# Patient Record
Sex: FEMALE | Race: WHITE | NOT HISPANIC OR LATINO | Employment: OTHER | URBAN - METROPOLITAN AREA
[De-identification: names, ages, dates, MRNs, and addresses within clinical notes are randomized per-mention and may not be internally consistent; named-entity substitution may affect disease eponyms.]

---

## 2020-02-01 ENCOUNTER — HOSPITAL ENCOUNTER (EMERGENCY)
Facility: HOSPITAL | Age: 75
Discharge: HOME | End: 2020-02-01
Attending: EMERGENCY MEDICINE
Payer: MEDICARE

## 2020-02-01 ENCOUNTER — APPOINTMENT (EMERGENCY)
Dept: RADIOLOGY | Facility: HOSPITAL | Age: 75
End: 2020-02-01
Attending: EMERGENCY MEDICINE
Payer: MEDICARE

## 2020-02-01 VITALS
SYSTOLIC BLOOD PRESSURE: 129 MMHG | OXYGEN SATURATION: 94 % | BODY MASS INDEX: 25.76 KG/M2 | WEIGHT: 140 LBS | RESPIRATION RATE: 48 BRPM | HEIGHT: 62 IN | HEART RATE: 76 BPM | TEMPERATURE: 98.7 F | DIASTOLIC BLOOD PRESSURE: 78 MMHG

## 2020-02-01 DIAGNOSIS — R05.9 COUGH: Primary | ICD-10-CM

## 2020-02-01 DIAGNOSIS — J06.9 UPPER RESPIRATORY TRACT INFECTION, UNSPECIFIED TYPE: ICD-10-CM

## 2020-02-01 DIAGNOSIS — J40 BRONCHITIS: ICD-10-CM

## 2020-02-01 LAB
ALBUMIN SERPL-MCNC: 3.7 G/DL (ref 3.4–5)
ALP SERPL-CCNC: 88 IU/L (ref 35–126)
ALT SERPL-CCNC: 75 IU/L (ref 11–54)
ANION GAP SERPL CALC-SCNC: 10 MEQ/L (ref 3–15)
AST SERPL-CCNC: 52 IU/L (ref 15–41)
BASOPHILS # BLD: 0 K/UL (ref 0.01–0.1)
BASOPHILS NFR BLD: 0 %
BILIRUB SERPL-MCNC: 0.7 MG/DL (ref 0.3–1.2)
BILIRUB UR QL STRIP.AUTO: NEGATIVE MG/DL
BNP SERPL-MCNC: 32 PG/ML
BUN SERPL-MCNC: 15 MG/DL (ref 8–20)
CALCIUM SERPL-MCNC: 8.9 MG/DL (ref 8.9–10.3)
CHLORIDE SERPL-SCNC: 100 MEQ/L (ref 98–109)
CLARITY UR REFRACT.AUTO: CLEAR
CO2 SERPL-SCNC: 29 MEQ/L (ref 22–32)
COLOR UR AUTO: YELLOW
CREAT SERPL-MCNC: 0.7 MG/DL
DIFFERENTIAL METHOD BLD: ABNORMAL
EOSINOPHIL # BLD: 0 K/UL (ref 0.04–0.36)
EOSINOPHIL NFR BLD: 0 %
ERYTHROCYTE [DISTWIDTH] IN BLOOD BY AUTOMATED COUNT: 13.8 % (ref 11.7–14.4)
FLUAV RNA SPEC QL NAA+PROBE: NEGATIVE
FLUBV RNA SPEC QL NAA+PROBE: NEGATIVE
GFR SERPL CREATININE-BSD FRML MDRD: >60 ML/MIN/1.73M*2
GLUCOSE SERPL-MCNC: 185 MG/DL (ref 70–99)
GLUCOSE UR STRIP.AUTO-MCNC: NEGATIVE MG/DL
HCT VFR BLDCO AUTO: 37.9 %
HGB BLD-MCNC: 12.3 G/DL (ref 11.8–15.7)
HGB UR QL STRIP.AUTO: NEGATIVE
KETONES UR STRIP.AUTO-MCNC: NEGATIVE MG/DL
LEUKOCYTE ESTERASE UR QL STRIP.AUTO: NEGATIVE
LYMPHOCYTES # BLD: 0.38 K/UL (ref 1.2–3.5)
LYMPHOCYTES NFR BLD: 6 %
MCH RBC QN AUTO: 28.3 PG (ref 28–33.2)
MCHC RBC AUTO-ENTMCNC: 32.5 G/DL (ref 32.2–35.5)
MCV RBC AUTO: 87.3 FL (ref 83–98)
MONOCYTES # BLD: 0.26 K/UL (ref 0.28–0.8)
MONOCYTES NFR BLD: 4 %
NEUTS BAND # BLD: 0.06 K/UL (ref 0–0.53)
NEUTS BAND # BLD: 5.61 K/UL (ref 1.7–7)
NEUTS BAND NFR BLD: 1 %
NEUTS SEG NFR BLD: 88 %
NITRITE UR QL STRIP.AUTO: NEGATIVE
PDW BLD AUTO: 9.9 FL (ref 9.4–12.3)
PH UR STRIP.AUTO: 6 [PH]
PLAT MORPH BLD: NORMAL
PLATELET # BLD AUTO: 186 K/UL
PLATELET # BLD EST: ABNORMAL 10*3/UL
POTASSIUM SERPL-SCNC: 3 MEQ/L (ref 3.6–5.1)
PROT SERPL-MCNC: 6.9 G/DL (ref 6–8.2)
PROT UR QL STRIP.AUTO: NEGATIVE
RBC # BLD AUTO: 4.34 M/UL (ref 3.93–5.22)
RBC MORPH BLD: NORMAL
RSV RNA SPEC QL NAA+PROBE: NEGATIVE
SODIUM SERPL-SCNC: 139 MEQ/L (ref 136–144)
SP GR UR REFRACT.AUTO: 1.01
TROPONIN I SERPL-MCNC: <0.02 NG/ML
UROBILINOGEN UR STRIP-ACNC: 0.2 EU/DL
VARIANT LYMPHS NFR BLD: 1 %
WBC # BLD AUTO: 6.38 K/UL

## 2020-02-01 PROCEDURE — 71046 X-RAY EXAM CHEST 2 VIEWS: CPT

## 2020-02-01 PROCEDURE — 25000000 HC PHARMACY GENERAL: Performed by: EMERGENCY MEDICINE

## 2020-02-01 PROCEDURE — 87631 RESP VIRUS 3-5 TARGETS: CPT | Performed by: EMERGENCY MEDICINE

## 2020-02-01 PROCEDURE — 85025 COMPLETE CBC W/AUTO DIFF WBC: CPT | Performed by: EMERGENCY MEDICINE

## 2020-02-01 PROCEDURE — 94640 AIRWAY INHALATION TREATMENT: CPT

## 2020-02-01 PROCEDURE — 93005 ELECTROCARDIOGRAM TRACING: CPT | Performed by: EMERGENCY MEDICINE

## 2020-02-01 PROCEDURE — 83880 ASSAY OF NATRIURETIC PEPTIDE: CPT | Performed by: EMERGENCY MEDICINE

## 2020-02-01 PROCEDURE — 99283 EMERGENCY DEPT VISIT LOW MDM: CPT | Mod: 25

## 2020-02-01 PROCEDURE — 84484 ASSAY OF TROPONIN QUANT: CPT | Performed by: EMERGENCY MEDICINE

## 2020-02-01 PROCEDURE — 36415 COLL VENOUS BLD VENIPUNCTURE: CPT | Performed by: EMERGENCY MEDICINE

## 2020-02-01 PROCEDURE — 81003 URINALYSIS AUTO W/O SCOPE: CPT | Performed by: EMERGENCY MEDICINE

## 2020-02-01 PROCEDURE — 80053 COMPREHEN METABOLIC PANEL: CPT | Performed by: EMERGENCY MEDICINE

## 2020-02-01 RX ORDER — ANASTROZOLE 1 MG/1
1 TABLET ORAL EVERY MORNING
COMMUNITY
Start: 2019-11-26

## 2020-02-01 RX ORDER — POTASSIUM CHLORIDE 20 MEQ/1
40 TABLET, EXTENDED RELEASE ORAL ONCE
Status: DISCONTINUED | OUTPATIENT
Start: 2020-02-01 | End: 2020-02-01 | Stop reason: HOSPADM

## 2020-02-01 RX ORDER — IPRATROPIUM BROMIDE AND ALBUTEROL SULFATE 2.5; .5 MG/3ML; MG/3ML
3 SOLUTION RESPIRATORY (INHALATION) ONCE
Status: COMPLETED | OUTPATIENT
Start: 2020-02-01 | End: 2020-02-01

## 2020-02-01 RX ORDER — ALBUTEROL SULFATE 90 UG/1
AEROSOL, METERED RESPIRATORY (INHALATION)
COMMUNITY
Start: 2020-01-28 | End: 2022-10-10

## 2020-02-01 RX ORDER — ALBUTEROL SULFATE 0.83 MG/ML
2.5 SOLUTION RESPIRATORY (INHALATION) EVERY 6 HOURS PRN
Qty: 75 ML | Refills: 0 | Status: SHIPPED | OUTPATIENT
Start: 2020-02-01 | End: 2022-10-10

## 2020-02-01 RX ORDER — SIMVASTATIN 20 MG/1
20 TABLET, FILM COATED ORAL EVERY MORNING
COMMUNITY
Start: 2019-12-03

## 2020-02-01 RX ORDER — MONTELUKAST SODIUM 10 MG/1
10 TABLET ORAL EVERY MORNING
COMMUNITY
Start: 2017-08-25

## 2020-02-01 RX ORDER — PREDNISONE 10 MG/1
TABLET ORAL
COMMUNITY
Start: 2020-01-27 | End: 2022-10-10

## 2020-02-01 RX ORDER — CODEINE PHOSPHATE AND GUAIFENESIN 10; 100 MG/5ML; MG/5ML
5 SOLUTION ORAL
COMMUNITY
Start: 2019-03-12 | End: 2022-10-10

## 2020-02-01 RX ORDER — AZITHROMYCIN 250 MG/1
TABLET, FILM COATED ORAL
COMMUNITY
Start: 2019-11-26 | End: 2022-10-27

## 2020-02-01 RX ORDER — OLMESARTAN MEDOXOMIL AND HYDROCHLOROTHIAZIDE 20/12.5 20; 12.5 MG/1; MG/1
1 TABLET ORAL EVERY MORNING
COMMUNITY
Start: 2019-11-26

## 2020-02-01 RX ORDER — CIPROFLOXACIN 500 MG/1
TABLET ORAL
COMMUNITY
Start: 2020-01-15 | End: 2022-10-27

## 2020-02-01 RX ORDER — VALACYCLOVIR HYDROCHLORIDE 1 G/1
1000 TABLET, FILM COATED ORAL AS NEEDED
COMMUNITY
Start: 2020-01-15

## 2020-02-01 RX ORDER — ESOMEPRAZOLE MAGNESIUM 40 MG/1
40 CAPSULE, DELAYED RELEASE ORAL EVERY MORNING
COMMUNITY
Start: 2019-11-26

## 2020-02-01 RX ORDER — FLUTICASONE PROPIONATE AND SALMETEROL 50; 250 UG/1; UG/1
POWDER RESPIRATORY (INHALATION)
COMMUNITY
Start: 2019-12-30 | End: 2022-10-10

## 2020-02-01 RX ADMIN — IPRATROPIUM BROMIDE AND ALBUTEROL SULFATE 3 ML: 2.5; .5 SOLUTION RESPIRATORY (INHALATION) at 14:14

## 2020-02-01 ASSESSMENT — ENCOUNTER SYMPTOMS
DIZZINESS: 0
CONFUSION: 0
ARTHRALGIAS: 0
NAUSEA: 0
CHILLS: 0
EYE PAIN: 0
CHEST TIGHTNESS: 1
COUGH: 1
VOMITING: 0
NECK PAIN: 0
BLOOD IN STOOL: 0
DIARRHEA: 0
RHINORRHEA: 0
HEADACHES: 0
CONSTIPATION: 0
SHORTNESS OF BREATH: 0
WEAKNESS: 0
LIGHT-HEADEDNESS: 0
BACK PAIN: 0
FEVER: 0
DIFFICULTY URINATING: 0

## 2020-02-01 NOTE — DISCHARGE INSTRUCTIONS
Follow closely with your regular doctor and your pulmonologist.  Use your nebulizer every 4-6 hours as needed.  Continue all your medications as previously prescribed.  Return to the emergency department for worsening or other concerns.

## 2020-02-01 NOTE — ED PROVIDER NOTES
HPI     Chief Complaint   Patient presents with   • Cough   • Shortness of Breath       Patient is a 74-year-old female presents the emergency department complaining of cough for 2 weeks.  Patient states she has a history of bronchiectasis and is on suppressive antibiotics daily.  She states that on 20 January she started to feel she had URI symptoms.  She called her pulmonologist and was prescribed Cipro, prednisone, cough medicines, etc.  Patient has been taking her medications without relief.  She also visited with her primary doctor this week.  She states that she had a chest x-ray 2 to 3 days ago.  She said that she was told that she had no pneumonia.  Despite this, patient states that she has not been able to sleep at night.  She reports that her symptoms are worse when she lies down and tries to rest.  She denies fever.  She has chest discomfort associated with the cough.  She has no shortness of breath.  She has no nausea, vomiting, diarrhea.  She has no skin rashes or other symptoms to report at time of evaluation.           Patient History     Past Medical History:   Diagnosis Date   • Bronchiectasis (CMS/HCC)    • Diverticulitis of colon    • GERD (gastroesophageal reflux disease)    • Lupus (CMS/HCC)        History reviewed. No pertinent surgical history.    History reviewed. No pertinent family history.    Social History     Tobacco Use   • Smoking status: Former Smoker   • Smokeless tobacco: Never Used   Substance Use Topics   • Alcohol use: Not Currently   • Drug use: Not Currently       Systems Reviewed from Nursing Triage:          Review of Systems     Review of Systems   Constitutional: Negative for chills and fever.   HENT: Negative for congestion, rhinorrhea and sneezing.    Eyes: Negative for pain.   Respiratory: Positive for cough and chest tightness. Negative for shortness of breath.    Cardiovascular: Negative for chest pain.   Gastrointestinal: Negative for blood in stool, constipation,  "diarrhea, nausea and vomiting.   Genitourinary: Negative for difficulty urinating.   Musculoskeletal: Negative for arthralgias, back pain and neck pain.   Skin: Negative for rash.   Neurological: Negative for dizziness, weakness, light-headedness and headaches.   Psychiatric/Behavioral: Negative for confusion.        Physical Exam     ED Triage Vitals [02/01/20 1322]   Temp Heart Rate Resp BP SpO2   37.1 °C (98.7 °F) 93 17 130/62 93 %      Temp Source Heart Rate Source Patient Position BP Location FiO2 (%) (Set)   Oral -- Sitting Right upper arm --       Pulse Ox %: 93 % (02/01/20 1328)  Pulse Ox Interpretation: Low (02/01/20 1328)  Heart Rate: 93 (02/01/20 1328)       Patient Vitals for the past 24 hrs:   BP Temp Temp src Pulse Resp SpO2 Height Weight   02/01/20 1454 -- -- -- 81 (!) 24 92 % -- --   02/01/20 1322 130/62 37.1 °C (98.7 °F) Oral 93 17 93 % 1.575 m (5' 2\") 63.5 kg (140 lb)                                       Physical Exam   Constitutional: She is oriented to person, place, and time. She appears well-developed and well-nourished.   HENT:   Head: Normocephalic.   Nose: Nose normal.   Eyes: Pupils are equal, round, and reactive to light. Conjunctivae and EOM are normal.   Neck: Normal range of motion. Neck supple.   Cardiovascular: Normal rate, regular rhythm, normal heart sounds and intact distal pulses.   Pulmonary/Chest: Effort normal. She has no decreased breath sounds. She has wheezes. She has rales. She exhibits no tenderness.   Abdominal: Soft. Bowel sounds are normal.   Musculoskeletal: Normal range of motion. She exhibits no edema.        Right lower leg: Normal.        Left lower leg: Normal.   Lymphadenopathy:     She has no cervical adenopathy.   Neurological: She is alert and oriented to person, place, and time. She has normal strength. No sensory deficit.   Skin: Skin is warm and dry.   Psychiatric: She has a normal mood and affect. Her behavior is normal.   Nursing note and vitals " reviewed.           Procedures    ED Course & MDM     Labs Reviewed   CBC AND DIFF - Abnormal       Result Value    WBC 6.38      RBC 4.34      Hemoglobin 12.3      Hematocrit 37.9      MCV 87.3      MCH 28.3      MCHC 32.5      RDW 13.8      Platelets 186      MPV 9.9      Differential Type Manu      Neutrophils 88      Lymphocytes 6      Monocytes 4      Eosinophils 0      Basophils 0      Bands 1      Atypical Lymphocytes 1      Neutrophils, Absolute 5.61      Lymphocytes, Absolute 0.38 (*)     Monocytes, Absolute 0.26 (*)     Eosinophils, Absolute 0.00 (*)     Basophils, Absolute 0.00 (*)     Bands, Absolute 0.06      RBC Morphology Normal      PLT Morphology Normal      Platelet Estimate Adequate (150,000-400,000)     COMPREHENSIVE METABOLIC PANEL - Abnormal    Sodium 139      Potassium 3.0 (*)     Chloride 100      CO2 29      BUN 15      Creatinine 0.7      Glucose 185 (*)     Calcium 8.9      AST (SGOT) 52 (*)     ALT (SGPT) 75 (*)     Alkaline Phosphatase 88      Total Protein 6.9      Albumin 3.7      Bilirubin, Total 0.7      eGFR >60.0      Anion Gap 10     RSV AND INFLUENZA A/B NUCLEIC ACIDS, PCR - Normal    Influenza A Negative      Influenza B Negative      Respiratory Syncytial Virus Negative     TROPONIN I - Normal    Troponin I <0.02     B-TYPE NATRIURETIC PEPTIDE - Normal    BNP 32     UA REFLEX CULTURE (MACROSCOPIC) - Normal    Color, Urine Yellow      Clarity, Urine Clear      Specific Gravity, Urine 1.009      pH, Urine 6.0      Leukocyte Esterase Negative      Nitrite, Urine Negative      Protein, Urine Negative      Glucose, Urine Negative      Ketones, Urine Negative      Urobilinogen, Urine 0.2      Bilirubin, Urine Negative      Blood, Urine Negative     URINALYSIS REFLEX CULTURE (ED AND OUTPATIENT ONLY)    Narrative:     The following orders were created for panel order Urinalysis w/ reflex culture.  Procedure                               Abnormality         Status                      ---------                               -----------         ------                     UA Reflex to Culture (Mac...[[id number]]  Normal              Final result                 Please view results for these tests on the individual orders.       X-RAY CHEST 2 VIEWS   Final Result   IMPRESSION:   Mild scarring in the left lung base      ECG 12 lead   ED Interpretation   Normal sinus rhythm, no STEMI.  Nonspecific ST changes                        MDM  Number of Diagnoses or Management Options  Bronchitis:   Cough:   Upper respiratory tract infection, unspecified type:   Diagnosis management comments: Possible causes of symptoms considered including but not limited to viral illness, URI, pneumonia, bronchitis, etc.  Plan to check basic labs and studies.           ED Course as of Feb 01 1613   Sat Feb 01, 2020   1556 Labs and studies reviewed.  No acute findings noted.  Patient feeling better after neb treatment.  Plan to prescribe patient albuterol for nebulizer she has a nebulizer machine at home that she has not been using.  Patient instructed to follow close with her primary as well as her pulmonologist.    [RP]   1611 Patient able to ambulate throughout the emergency department without difficulty or assistance.  Pulse ox maintained 93 to 94%.    [RP]      ED Course User Index  [RP] Erika Rai DO         Clinical Impressions as of Feb 01 1613   Cough   Upper respiratory tract infection, unspecified type   Bronchitis        Erika Rai DO  02/01/20 1613

## 2020-02-02 LAB
ATRIAL RATE: 86
P AXIS: -4
PR INTERVAL: 132
QRS DURATION: 80
QT INTERVAL: 392
QTC CALCULATION(BAZETT): 469
R AXIS: 41
T WAVE AXIS: 68
VENTRICULAR RATE: 86

## 2020-02-02 PROCEDURE — 93010 ELECTROCARDIOGRAM REPORT: CPT | Performed by: INTERNAL MEDICINE

## 2022-07-30 ENCOUNTER — TELEPHONE ENCOUNTER (OUTPATIENT)
Age: 77
End: 2022-07-30

## 2022-07-31 ENCOUNTER — TELEPHONE ENCOUNTER (OUTPATIENT)
Age: 77
End: 2022-07-31

## 2022-10-10 ENCOUNTER — OFFICE VISIT (OUTPATIENT)
Dept: UROGYNECOLOGY | Facility: CLINIC | Age: 77
End: 2022-10-10
Payer: MEDICARE

## 2022-10-10 VITALS
BODY MASS INDEX: 25.4 KG/M2 | WEIGHT: 138 LBS | DIASTOLIC BLOOD PRESSURE: 80 MMHG | SYSTOLIC BLOOD PRESSURE: 120 MMHG | HEIGHT: 62 IN

## 2022-10-10 DIAGNOSIS — N99.3 VAGINAL VAULT PROLAPSE AFTER HYSTERECTOMY: Primary | ICD-10-CM

## 2022-10-10 DIAGNOSIS — N39.3 STRESS INCONTINENCE: ICD-10-CM

## 2022-10-10 DIAGNOSIS — N39.41 URGE INCONTINENCE: ICD-10-CM

## 2022-10-10 PROCEDURE — 99203 OFFICE O/P NEW LOW 30 MIN: CPT | Mod: 25 | Performed by: NURSE PRACTITIONER

## 2022-10-10 PROCEDURE — 51701 INSERT BLADDER CATHETER: CPT | Performed by: NURSE PRACTITIONER

## 2022-10-10 ASSESSMENT — ENCOUNTER SYMPTOMS
CHILLS: 0
ADENOPATHY: 0
BREAST MASS: 0
DIARRHEA: 0
FEVER: 0
NAUSEA: 0
DIAPHORESIS: 0
BREAST PAIN: 0
BRUISES/BLEEDS EASILY: 0
SEIZURES: 0
FATIGUE: 0
POLYDIPSIA: 0
HALLUCINATIONS: 0
WHEEZING: 0
CONSTIPATION: 0
NECK STIFFNESS: 0
SHORTNESS OF BREATH: 0
PALPITATIONS: 0
NECK PAIN: 0
LIGHT-HEADEDNESS: 0
FACIAL ASYMMETRY: 0
AGITATION: 0
COUGH: 0
JOINT SWELLING: 0
ABDOMINAL PAIN: 0
APPETITE CHANGE: 0

## 2022-10-10 NOTE — PROCEDURES
Procedures  Procedure Note:  (15994) The urethra was prepped, and a lubricated 12 [place] catheter was inserted to collect a post void residual.  The procedure was well tolerated by the patient  The PVR amount is recorded (100 ml)

## 2022-10-10 NOTE — PROGRESS NOTES
"Visit Date: 10/10/2022   Subjective   Ursula Holland is 77 y.o. female who is self referred for evaluation of Prolapse and Urine Leakage    Ursula Sharma is a 77 y.o. female presenting with c/o vaginal bulge. First noticed 8 years ago. Bulge is present constantly. Worse with standing, heavy lifting and bowel movements. She feels her bladder empties poorly, hesitant to start. Reports prolapse reduction for comfort and repositioning for urination. Denies digital splinting. She is sexually active but with difficulty due to prolapse.    Bowels are formed but she has control issues with looser stools.     She reports leakage with urgency for 2 year(s). Frequency x5, urge leakage x2, nocturia x1, wears 0 pads per day but changes her underwear once a day for wetness. She has not tried any medications for this problem.    Ursula Sharma reports leakage with coughing, sneezing, position changes, exercise and orgasm. This occurs occasionally, starting many year(s) ago.     Denies recurrent UTI. She saw Dr. Ng in the past and was considering surgery. She also tried a pessary in the past. #3 was too big, #1 was too small.    TAHBSO at age 43. FH of ovarian cancer.      Objective   Visit Vitals  /80   Ht 1.575 m (5' 2\")   Wt 62.6 kg (138 lb)   BMI 25.24 kg/m²     Medications:   Current Outpatient Medications:     anastrozole (ARIMIDEX) 1 mg tablet, TK 1 T PO QAM, Disp: , Rfl:     azithromycin (ZITHROMAX) 250 mg tablet, TK 1 T PO 3 TIMES A WEEK, Disp: , Rfl:     ciprofloxacin (CIPRO) 500 mg tablet, , Disp: , Rfl:     esomeprazole (NexIUM) 40 mg capsule, TK 1 C PO QAM, Disp: , Rfl:     fluticasone propion/salmeterol (WIXELA INHUB INHL), Inhale., Disp: , Rfl:     montelukast (SINGULAIR) 10 mg tablet, TK 1 T PO D, Disp: , Rfl:     olmesartan-hydrochlorothiazide (BENICAR HCT) 20-12.5 mg per tablet, TK 1 T PO D, Disp: , Rfl:     simvastatin (ZOCOR) 20 mg tablet, TK 1 T PO Q NIGHT, Disp: , Rfl:     valACYclovir (VALTREX) " 1 gram tablet, TK 1 T PO QD FOR 5 DAYS PRF FLARE, Disp: , Rfl:    Allergies: is allergic to adhesive tape-silicones, iodinated contrast media, oxycodone, red dye, and shellfish derived.     OB History        2    Para   2    Term                AB        Living   2       SAB        IAB        Ectopic        Multiple        Live Births   2               Past Medical History:  has a past medical history of Bronchiectasis (CMS/HCC), Diverticulitis of colon, GERD (gastroesophageal reflux disease), and Lupus (CMS/HCC).  Past Surgical History:  has no past surgical history on file.  Family History: family history is not on file.  Social History:   Social History     Tobacco Use    Smoking status: Former    Smokeless tobacco: Never   Substance Use Topics    Alcohol use: Not Currently    Drug use: Not Currently       Review of Systems   Constitutional: Negative for appetite change, chills, diaphoresis, fatigue and fever.   Respiratory: Negative for cough, shortness of breath and wheezing.    Cardiovascular: Negative for chest pain and palpitations.   Gastrointestinal: Negative for abdominal pain, constipation, diarrhea and nausea.   Endocrine: Negative for polydipsia.   Genitourinary:        See HPI   Breast: Negative for breast discharge, breast mass and breast pain.   Musculoskeletal: Negative for joint swelling, neck pain and neck stiffness.   Skin: Negative for pallor and rash.   Neurological: Negative for seizures, facial asymmetry and light-headedness.   Hematological: Negative for adenopathy. Does not bruise/bleed easily.   Psychiatric/Behavioral: Negative for agitation and hallucinations.     Physical Exam  Constitutional:       Appearance: Normal appearance. She is well-developed.   Genitourinary:      Pelvic exam was performed with patient in the lithotomy position.      Urethra, rectum and urethral meatus normal.      Cervix is absent.      Uterus is absent.        Right Adnexa: absent.     Left  Adnexa: absent.   Genitourinary Comments: Mainly anterior and apical prolapse with valsalva   Pelvic exam was performed with patient in lithotomy exam position.     External female genitalia normal.   Urethral meatus normal.   Urethra normal.   Normal bladder. There is a vaginal vault prolapse present.  The cervix is absent.   Uterus is absent. Rectal exam normal. HENT:      Head: Normocephalic and atraumatic.   Neck:      Thyroid: No thyromegaly.   Pulmonary:      Effort: Pulmonary effort is normal.   Abdominal:      Palpations: Abdomen is soft.      Tenderness: There is no abdominal tenderness.      Hernia: No hernia is present.   Musculoskeletal:      Cervical back: Normal range of motion and neck supple.   Lymphadenopathy:      Cervical: No cervical adenopathy.   Neurological:      Mental Status: She is alert and oriented to person, place, and time.   Skin:     General: Skin is warm and dry.   Psychiatric:         Behavior: Behavior normal.         Thought Content: Thought content normal.         Judgment: Judgment normal.   Vitals reviewed.          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 We discussed conservative treatment, pessary use and surgical repair. As her vagina is shortened and she is still sexually active, we focused mainly on the colpopexy surgery, though we did discuss briefly vaginal repair options. She will schedule a follow up with one of the surgeons.       Urge incontinence  Assessment & Plan:  I provided the patient with printed literature that reviews first line OAB therapy including weight loss with diet and exercise, caffeine reduction, fluid reduction (25-50%, when appropriate)  bladder retraining, and pelvic muscle exercises.  We also discussed that it is possible this cold improve after surgery but this is not  assured.      Stress incontinence  Assessment & Plan:  Stress incontinence by pt history.  Patient provided with literature concerning: kegels, TVT sling. Pt education done regarding surgical and non-surgical options for TYLER., including risks and complications. She should have UD testing before surgical repair.        Return for visit with MD for evaluation and to decide on surgery.

## 2022-10-10 NOTE — ASSESSMENT & PLAN NOTE
From: Avery De Leon  To: Yoel Bruce MD  Sent: 9/22/2022 3:37 PM CDT  Subject: Prescription Renewal    Hello,    I'm just about out of my last refill of Aderall and unfortunately the next time you're available to be seen for a follow up to write a refill is in November. I can't go that long without the medication and want to know if there is any other way to get the prescription refilled sooner. If you aren't available to be seen is there someone else I can see within the next week or is there another way to get a refill before our next visit? Please advise. Thanks. Ursula has vault prolapse, most fully appreciated with valsalva to +1. We discussed conservative treatment, pessary use and surgical repair. As her vagina is shortened and she is still sexually active, we focused mainly on the colpopexy surgery, though we did discuss briefly vaginal repair options. She will schedule a follow up with one of the surgeons.

## 2022-10-10 NOTE — ASSESSMENT & PLAN NOTE
I provided the patient with printed literature that reviews first line OAB therapy including weight loss with diet and exercise, caffeine reduction, fluid reduction (25-50%, when appropriate)  bladder retraining, and pelvic muscle exercises.  We also discussed that it is possible this cold improve after surgery but this is not assured.

## 2022-10-10 NOTE — ASSESSMENT & PLAN NOTE
Stress incontinence by pt history.  Patient provided with literature concerning: kegels, TVT sling. Pt education done regarding surgical and non-surgical options for TYLER., including risks and complications. She should have UD testing before surgical repair.

## 2022-10-17 ENCOUNTER — OFFICE VISIT (OUTPATIENT)
Dept: UROGYNECOLOGY | Facility: CLINIC | Age: 77
End: 2022-10-17
Payer: MEDICARE

## 2022-10-17 VITALS
SYSTOLIC BLOOD PRESSURE: 120 MMHG | DIASTOLIC BLOOD PRESSURE: 80 MMHG | BODY MASS INDEX: 25.4 KG/M2 | WEIGHT: 138 LBS | HEIGHT: 62 IN

## 2022-10-17 DIAGNOSIS — N39.41 URGE INCONTINENCE: ICD-10-CM

## 2022-10-17 DIAGNOSIS — N39.3 STRESS INCONTINENCE: ICD-10-CM

## 2022-10-17 DIAGNOSIS — R15.9 FULL INCONTINENCE OF FECES: ICD-10-CM

## 2022-10-17 DIAGNOSIS — N99.3 VAGINAL VAULT PROLAPSE AFTER HYSTERECTOMY: Primary | ICD-10-CM

## 2022-10-17 PROCEDURE — 99214 OFFICE O/P EST MOD 30 MIN: CPT | Performed by: OBSTETRICS & GYNECOLOGY

## 2022-10-17 ASSESSMENT — ENCOUNTER SYMPTOMS
FEVER: 0
HALLUCINATIONS: 0
POLYDIPSIA: 0
LIGHT-HEADEDNESS: 0
AGITATION: 0
ADENOPATHY: 0
FACIAL ASYMMETRY: 0
DIARRHEA: 0
SEIZURES: 0
BREAST PAIN: 0
CHILLS: 0
ABDOMINAL PAIN: 0
NECK PAIN: 0
JOINT SWELLING: 0
NAUSEA: 0
DIAPHORESIS: 0
BRUISES/BLEEDS EASILY: 0
FATIGUE: 0
SHORTNESS OF BREATH: 0
CONSTIPATION: 0
BREAST MASS: 0
PALPITATIONS: 0
NECK STIFFNESS: 0
COUGH: 0
WHEEZING: 0
APPETITE CHANGE: 0

## 2022-10-17 NOTE — ASSESSMENT & PLAN NOTE
We may or may not consider a posterior vaginal repair in same setting  This additional procedure could contribute to de franky dyspareunia

## 2022-10-17 NOTE — ASSESSMENT & PLAN NOTE
Stage 2A anterior vault prolapse with shortened vagina  She did not do well with pessary  Her best options is sacral colpopexy  She is still sexually active

## 2022-10-17 NOTE — PROGRESS NOTES
"Visit Date: 10/17/2022   Subjective   Ursula Holland is 77 y.o. female who is referred by Kyara THURSTON for evaluation of Prolapse    Ursula Sharma is a 77 y.o. female presenting with c/o vaginal bulge. First noticed 8 years ago. Bulge is present constantly. Worse with standing, heavy lifting and bowel movements. She feels her bladder empties poorly, hesitant to start. Reports prolapse reduction for comfort and repositioning for urination. Denies digital splinting. She is sexually active but with difficulty due to prolapse. She previously saw Dr Ng who tried a few pessaries but they fell out.     Bowels are formed but she has control issues with looser stools.     She reports leakage with urgency for 2 year(s). Frequency x5, urge leakage x2, nocturia x1, wears 0 pads per day but changes her underwear once a day for wetness. She has not tried any medications for this problem.    Ursula Sharma reports leakage with coughing, sneezing, position changes, exercise and orgasm. This occurs occasionally, starting many year(s) ago.     Denies recurrent UTI. She saw Dr. Ng in the past and was considering surgery. She also tried a pessary in the past. #3 was too big, #1 was too small.    TAHBSO at age 43 for fibroids. FH of ovarian cancer.      Objective   Visit Vitals  /80   Ht 1.575 m (5' 2\")   Wt 62.6 kg (138 lb)   BMI 25.24 kg/m²     Medications:   Current Outpatient Medications:     anastrozole (ARIMIDEX) 1 mg tablet, TK 1 T PO QAM, Disp: , Rfl:     azithromycin (ZITHROMAX) 250 mg tablet, TK 1 T PO 3 TIMES A WEEK, Disp: , Rfl:     ciprofloxacin (CIPRO) 500 mg tablet, , Disp: , Rfl:     esomeprazole (NexIUM) 40 mg capsule, TK 1 C PO QAM, Disp: , Rfl:     fluticasone propion/salmeterol (WIXELA INHUB INHL), Inhale., Disp: , Rfl:     montelukast (SINGULAIR) 10 mg tablet, TK 1 T PO D, Disp: , Rfl:     olmesartan-hydrochlorothiazide (BENICAR HCT) 20-12.5 mg per tablet, TK 1 T PO D, Disp: , Rfl:     " simvastatin (ZOCOR) 20 mg tablet, TK 1 T PO Q NIGHT, Disp: , Rfl:     valACYclovir (VALTREX) 1 gram tablet, TK 1 T PO QD FOR 5 DAYS PRF FLARE, Disp: , Rfl:    Allergies: is allergic to adhesive tape-silicones, iodinated contrast media, oxycodone, red dye, and shellfish derived.     OB History        2    Para   2    Term                AB        Living   2       SAB        IAB        Ectopic        Multiple        Live Births   2               Past Medical History:  has a past medical history of Bronchiectasis (CMS/HCC), Diverticulitis of colon, GERD (gastroesophageal reflux disease), and Lupus (CMS/HCC).  Past Surgical History:  has no past surgical history on file.  Family History: family history is not on file.  Social History:   Social History     Tobacco Use    Smoking status: Former    Smokeless tobacco: Never   Substance Use Topics    Alcohol use: Not Currently    Drug use: Not Currently       Review of Systems   Constitutional: Negative for appetite change, chills, diaphoresis, fatigue and fever.   Respiratory: Negative for cough, shortness of breath and wheezing.    Cardiovascular: Negative for chest pain and palpitations.   Gastrointestinal: Negative for abdominal pain, constipation, diarrhea and nausea.   Endocrine: Negative for polydipsia.   Genitourinary:        See HPI   Breast: Negative for breast discharge, breast mass and breast pain.   Musculoskeletal: Negative for joint swelling, neck pain and neck stiffness.   Skin: Negative for pallor and rash.   Neurological: Negative for seizures, facial asymmetry and light-headedness.   Hematological: Negative for adenopathy. Does not bruise/bleed easily.   Psychiatric/Behavioral: Negative for agitation and hallucinations.     Physical Exam  Constitutional:       Appearance: Normal appearance. She is well-developed.   Genitourinary:      Pelvic exam was performed with patient in the lithotomy position.      Urethra, rectum and urethral  meatus normal.      No urethral diverticulum present.      Cervix is absent.      Uterus is absent.        Right Adnexa: absent.     Left Adnexa: absent.   Genitourinary Comments: Mainly anterior and apical prolapse with valsalva   POP-Q measurements were:    Aa: 1, Ba: 1, C: 1     gH: pB: TVL: 8     Ap: -2, Bp: -2, D: x  Pelvic exam was performed with patient in lithotomy exam position.     External female genitalia normal.   Urethral meatus normal.   Urethra normal. There is no urethral urethrocele or urethral diverticulum. No stress urinary incontinence.  Normal bladder. There is a vaginal vault prolapse and rectocele present.  There is no enterocele present. Perineum intact. The cervix is absent.   Uterus is absent. Rectal exam normal. Rectal exam shows abnormal sphincter tone (weak sphincter). HENT:      Head: Normocephalic and atraumatic.   Neck:      Thyroid: No thyromegaly.   Pulmonary:      Effort: Pulmonary effort is normal.   Abdominal:      Palpations: Abdomen is soft.      Tenderness: There is no abdominal tenderness.      Hernia: No hernia is present.   Musculoskeletal:      Cervical back: Normal range of motion and neck supple.   Lymphadenopathy:      Cervical: No cervical adenopathy.   Neurological:      Mental Status: She is alert and oriented to person, place, and time.   Skin:     General: Skin is warm and dry.   Psychiatric:         Behavior: Behavior normal.         Thought Content: Thought content normal.         Judgment: Judgment normal.   Vitals reviewed.          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)  Assessment & Plan:  Stage 2A anterior vault prolapse with shortened vagina  She did not do well with pessary  Her best options is sacral colpopexy  She is still sexually active    Orders:  -     Case request operating room: ABDOMINAL SACRAL COLPOPEXY  CYSTOSCOPY  POSSIBLE TVT SLING, POSSIBLE POSTERIOR VAGINAL  REPAIR    Stress incontinence  Assessment & Plan:  Will do UD testing  She is at risk for worse postop TYLER    Orders:  -     Case request operating room: ABDOMINAL SACRAL COLPOPEXY  CYSTOSCOPY  POSSIBLE TVT SLING, POSSIBLE POSTERIOR VAGINAL REPAIR    Urge incontinence    Full incontinence of feces  Assessment & Plan:  We may or may not consider a posterior vaginal repair in same setting  This additional procedure could contribute to de franky dyspareunia        Return for urodynamics.

## 2022-10-17 NOTE — PATIENT INSTRUCTIONS
Stage 2 vaginal vault prolapse - abdominal sacral colpopexy  Stress incontinence - we will do urodynamic testing to confirm that a sling would help reduce urinary leakage after surgery  Fecal urgency/incontinence- to be dealt with later. Or possible posterior vaginal repair at time of initial surgery.

## 2022-10-18 ENCOUNTER — OFFICE VISIT (OUTPATIENT)
Dept: UROGYNECOLOGY | Facility: CLINIC | Age: 77
End: 2022-10-18
Payer: MEDICARE

## 2022-10-18 DIAGNOSIS — N39.3 STRESS INCONTINENCE: Primary | ICD-10-CM

## 2022-10-18 PROCEDURE — 51797 INTRAABDOMINAL PRESSURE TEST: CPT | Performed by: OBSTETRICS & GYNECOLOGY

## 2022-10-18 PROCEDURE — 51741 ELECTRO-UROFLOWMETRY FIRST: CPT | Mod: 51 | Performed by: OBSTETRICS & GYNECOLOGY

## 2022-10-18 PROCEDURE — 99999 PR OFFICE/OUTPT VISIT,PROCEDURE ONLY: CPT | Performed by: OBSTETRICS & GYNECOLOGY

## 2022-10-18 PROCEDURE — 51729 CYSTOMETROGRAM W/VP&UP: CPT | Performed by: OBSTETRICS & GYNECOLOGY

## 2022-10-18 NOTE — PROGRESS NOTES
"  [facility]  Mitchell Pandey MD, FACOG  Titi Faith MD, PhD, FACOG    Demographic Data:  Name: Ursula Holland  YOB: 1945  Referring Physician:    Date of Study: 10/18/2022     Ursula Holland was seen in the office today for urodynamic evaluation for symptoms consistent with suspected occult TYLER.    Free Uroflowmetry (CPT 64428):    Voided Volume: 0 mL  Voiding Pattern: low volume  Max Flow: 0 mL/s  Residual Urine: 110 mL    Filling Phase - Complex Cystometry (CPT 11124):    Urodynamic techniques were performed according to the \"Good Urodynamics Practice\" recommended by the International Continence Society (2002).    Two air-charged catheters were sterilized and calibrated. The three pressure sensors were then zeroed to atmospheric pressure. Intra-abdominal pressure was approximated by placing the first catheter intravaginally. The dual sensor catheter was placed in the bladder, with the proximal sensor positioned within the mid-urethra.    Filling Cystometry was performed with the patient in the seated and supine position. Sterile water was infused at a constant rate of 50 mL/min through a 7 [place] double-lumen transurethral catheter. Multichannel urodynamic data was collected with TDoc air- charged transducer catheters and analyzed on a [employer] System. Abdominal pressure was measured using a separate catheter placed in the vagina.     First Sensation of Bladder Fillin mL  First Desire to Void: 299 mL  Strong Desire to Void: 330 mL  Cystometric Capacity was Greater Than: 339 mL    Detrusor function and compliance during the filling phase were stable. Detrusor overactivity was not observed. Bladder sensation was clinically judged to be normal.    Urethral Pressure Profile (CPT 67646):    Maximum Urethral Pressure was recorded at 50 cm H2O.     Urodynamic stress incontinence was demonstrated with repetitive coughing.  Cough leak point pressure was 129 cm H2O. " This was done with pessary reduction of prolapse in the seated position    Voiding Phase - Pressure-Flow Study (CPT 18510, 71450): (with pessary)    The result of the voiding phase study was that the patient had a normal voiding pattern. She voided 414 mL. Her maximum urinary flow rate (Qmax) was 15 mL/s. Her calculated post-void residual was 0 mL. The detrusor pressure at maximum flow (Pdet @ Qmax) was 16 cm H2O. Maximum detrusor pressure (Max Pdet) was 32 cm H2O.    The tracings from this study have been scanned into the EMR. Written post-urodynamics instructions were provided in the after visit summary.    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

## 2022-10-27 ENCOUNTER — APPOINTMENT (OUTPATIENT)
Dept: LAB | Facility: HOSPITAL | Age: 77
End: 2022-10-27
Attending: OBSTETRICS & GYNECOLOGY
Payer: MEDICARE

## 2022-10-27 ENCOUNTER — HOSPITAL ENCOUNTER (OUTPATIENT)
Dept: RADIOLOGY | Facility: HOSPITAL | Age: 77
Discharge: HOME | End: 2022-10-27
Attending: OBSTETRICS & GYNECOLOGY
Payer: MEDICARE

## 2022-10-27 ENCOUNTER — OFFICE VISIT (OUTPATIENT)
Dept: UROGYNECOLOGY | Facility: CLINIC | Age: 77
End: 2022-10-27
Payer: MEDICARE

## 2022-10-27 ENCOUNTER — PREP FOR CASE (OUTPATIENT)
Dept: UROGYNECOLOGY | Facility: CLINIC | Age: 77
End: 2022-10-27
Payer: MEDICARE

## 2022-10-27 ENCOUNTER — APPOINTMENT (OUTPATIENT)
Dept: PREADMISSION TESTING | Facility: HOSPITAL | Age: 77
End: 2022-10-27
Attending: OBSTETRICS & GYNECOLOGY
Payer: MEDICARE

## 2022-10-27 ENCOUNTER — HOSPITAL ENCOUNTER (OUTPATIENT)
Dept: CARDIOLOGY | Facility: HOSPITAL | Age: 77
Discharge: HOME | End: 2022-10-27
Attending: OBSTETRICS & GYNECOLOGY
Payer: MEDICARE

## 2022-10-27 VITALS
HEART RATE: 64 BPM | BODY MASS INDEX: 25.64 KG/M2 | HEIGHT: 62 IN | TEMPERATURE: 98.4 F | DIASTOLIC BLOOD PRESSURE: 66 MMHG | OXYGEN SATURATION: 100 % | SYSTOLIC BLOOD PRESSURE: 141 MMHG | WEIGHT: 139.3 LBS

## 2022-10-27 VITALS
DIASTOLIC BLOOD PRESSURE: 80 MMHG | SYSTOLIC BLOOD PRESSURE: 120 MMHG | HEIGHT: 62 IN | BODY MASS INDEX: 25.4 KG/M2 | WEIGHT: 138 LBS

## 2022-10-27 DIAGNOSIS — N99.3 VAGINAL VAULT PROLAPSE AFTER HYSTERECTOMY: Primary | ICD-10-CM

## 2022-10-27 DIAGNOSIS — Z01.818 VISIT FOR PRE-OPERATIVE EXAMINATION: ICD-10-CM

## 2022-10-27 DIAGNOSIS — R15.9 FULL INCONTINENCE OF FECES: ICD-10-CM

## 2022-10-27 DIAGNOSIS — Z11.52 ENCOUNTER FOR SCREENING LABORATORY TESTING FOR COVID-19 VIRUS: ICD-10-CM

## 2022-10-27 DIAGNOSIS — Z01.818 VISIT FOR PRE-OPERATIVE EXAMINATION: Primary | ICD-10-CM

## 2022-10-27 DIAGNOSIS — Z01.818 PREOP TESTING: Primary | ICD-10-CM

## 2022-10-27 DIAGNOSIS — N39.3 STRESS INCONTINENCE: ICD-10-CM

## 2022-10-27 PROBLEM — L93.0 DISCOID LUPUS: Status: ACTIVE | Noted: 2022-10-27

## 2022-10-27 PROBLEM — K57.92 DIVERTICULITIS: Status: ACTIVE | Noted: 2022-10-27

## 2022-10-27 PROBLEM — I10 ESSENTIAL HYPERTENSION: Status: ACTIVE | Noted: 2019-10-09

## 2022-10-27 PROBLEM — K21.9 GERD (GASTROESOPHAGEAL REFLUX DISEASE): Status: ACTIVE | Noted: 2022-10-27

## 2022-10-27 LAB
ANION GAP SERPL CALC-SCNC: 6 MEQ/L (ref 3–15)
ATRIAL RATE: 60
BUN SERPL-MCNC: 14 MG/DL (ref 8–20)
CALCIUM SERPL-MCNC: 9.2 MG/DL (ref 8.9–10.3)
CHLORIDE SERPL-SCNC: 101 MEQ/L (ref 98–109)
CO2 SERPL-SCNC: 31 MEQ/L (ref 22–32)
CREAT SERPL-MCNC: 0.6 MG/DL (ref 0.6–1.1)
ERYTHROCYTE [DISTWIDTH] IN BLOOD BY AUTOMATED COUNT: 13.2 % (ref 11.7–14.4)
GFR SERPL CREATININE-BSD FRML MDRD: >60 ML/MIN/1.73M*2
GLUCOSE SERPL-MCNC: 95 MG/DL (ref 70–99)
HCT VFR BLDCO AUTO: 37 % (ref 35–45)
HGB BLD-MCNC: 11.9 G/DL (ref 11.8–15.7)
MCH RBC QN AUTO: 28.1 PG (ref 28–33.2)
MCHC RBC AUTO-ENTMCNC: 32.2 G/DL (ref 32.2–35.5)
MCV RBC AUTO: 87.5 FL (ref 83–98)
P AXIS: 57
PDW BLD AUTO: 10.3 FL (ref 9.4–12.3)
PLATELET # BLD AUTO: 196 K/UL (ref 150–369)
POTASSIUM SERPL-SCNC: 3.6 MEQ/L (ref 3.6–5.1)
PR INTERVAL: 142
QRS DURATION: 86
QT INTERVAL: 460
QTC CALCULATION(BAZETT): 460
R AXIS: 39
RBC # BLD AUTO: 4.23 M/UL (ref 3.93–5.22)
SODIUM SERPL-SCNC: 138 MEQ/L (ref 136–144)
T WAVE AXIS: 54
VENTRICULAR RATE: 60
WBC # BLD AUTO: 5.89 K/UL (ref 3.8–10.5)

## 2022-10-27 PROCEDURE — 99214 OFFICE O/P EST MOD 30 MIN: CPT | Performed by: OBSTETRICS & GYNECOLOGY

## 2022-10-27 PROCEDURE — 36415 COLL VENOUS BLD VENIPUNCTURE: CPT

## 2022-10-27 PROCEDURE — 80048 BASIC METABOLIC PNL TOTAL CA: CPT

## 2022-10-27 PROCEDURE — 85027 COMPLETE CBC AUTOMATED: CPT

## 2022-10-27 PROCEDURE — 93005 ELECTROCARDIOGRAM TRACING: CPT

## 2022-10-27 RX ORDER — ACETAMINOPHEN 325 MG/1
650 TABLET ORAL ONCE
Status: CANCELLED | OUTPATIENT
Start: 2022-11-03 | End: 2022-11-03

## 2022-10-27 RX ORDER — PHENAZOPYRIDINE HYDROCHLORIDE 95 MG/1
95 TABLET ORAL ONCE
Status: CANCELLED | OUTPATIENT
Start: 2022-11-03 | End: 2022-11-03

## 2022-10-27 RX ORDER — ACETAMINOPHEN 500 MG
500 TABLET ORAL EVERY 6 HOURS PRN
COMMUNITY
End: 2023-01-16

## 2022-10-27 RX ORDER — FLUTICASONE PROPIONATE AND SALMETEROL 250; 50 UG/1; UG/1
1 POWDER RESPIRATORY (INHALATION) EVERY EVENING
COMMUNITY
End: 2024-11-25

## 2022-10-27 RX ORDER — MINERAL OIL
180 ENEMA (ML) RECTAL EVERY MORNING
COMMUNITY

## 2022-10-27 RX ORDER — GABAPENTIN 100 MG/1
300 CAPSULE ORAL ONCE
Status: CANCELLED | OUTPATIENT
Start: 2022-11-03 | End: 2022-11-03

## 2022-10-27 RX ORDER — GUAIFENESIN 1200 MG/1
1 TABLET, EXTENDED RELEASE ORAL EVERY MORNING
COMMUNITY

## 2022-10-27 RX ORDER — AZITHROMYCIN 250 MG/1
250 TABLET, FILM COATED ORAL 3 TIMES WEEKLY
COMMUNITY

## 2022-10-27 RX ORDER — SODIUM CHLORIDE 9 MG/ML
INJECTION, SOLUTION INTRAVENOUS CONTINUOUS
Status: CANCELLED | OUTPATIENT
Start: 2022-11-03 | End: 2022-11-04

## 2022-10-27 ASSESSMENT — ENCOUNTER SYMPTOMS
JOINT SWELLING: 0
FATIGUE: 0
UNEXPECTED WEIGHT CHANGE: 0
LIGHT-HEADEDNESS: 0
COUGH: 0
ANAL BLEEDING: 0
DECREASED CONCENTRATION: 0
ABDOMINAL PAIN: 0
CHILLS: 0
WHEEZING: 0
NOCTURNAL DYSPNEA: 0
DIZZINESS: 0
SHORTNESS OF BREATH: 0
ABDOMINAL DISTENTION: 0
PALPITATIONS: 0
CONFUSION: 0
BRUISES/BLEEDS EASILY: 0

## 2022-10-27 ASSESSMENT — PAIN SCALES - GENERAL: PAINLEVEL: 0-NO PAIN

## 2022-10-27 NOTE — H&P
Preadmission Testing-  Pre-Operative H&P       Patient Name: Ursula Holland  Referring Surgeon: Dr. Pandey    Reason for Referral: Pre-Operative Evaluation  Surgical Procedure: ABDOMINAL SACRAL COLPOPEXY   CYSTOSCOPY   POSSIBLE TVT SLING, POSSIBLE POSTERIOR VAGINAL REPAIR  Operative Date: 11-3-22  Other Providers:      PCP: Aston Sosa DO          HISTORY OF PRESENT ILLNESS      Ursula Holland is a 77 y.o. female presenting today to the [facility] at [facility] for pre-operative evaluation prior to planned surgery.    Patient with vaginal prolapse and stress incontinence  She will undergo ABDOMINAL SACRAL COLPOPEXY CYSTOSCOPY   POSSIBLE TVT SLING, POSSIBLE POSTERIOR VAGINAL REPAIR    Functionally, the patient is able to ascend a flight or so of stairs with no dyspnea or chest pain. She can also walk 2 blocks. Functional capacity > 4 METS.    The patient denies, on specific questioning, the following:  No history of MI, valvular disease, arrhythmia,or CHF.  No history of TASHA.  No history of DVT/PE.  No history of COPD.  No history of CVA.  No history of DM.   No history of CKD.   No history of liver disease or cirrhosis.  No history of bleeding disorder.  No history of difficult airway/difficult intubation.  No history of Malignant hyperthermia.  No history of adverse reaction to anesthesia in the past.      In regards to any other pertinent history:  HTN, dyslipidemia  Bronchiectasis- gets pneumonia, last flair up 2021  Diverticulitis- hospitalized in past, no surgery, last flair up 9-2022 out patient treatment  Discoid lupus- uses topical treatment  GERD- on Omeprazole  Breast cancer- right lumpectomy, sentinel node bx, radiation  PAST MEDICAL AND SURGICAL HISTORY      Past Medical History:   Diagnosis Date    Breast cancer (CMS/HCC)     right lumpectomy, radiation    Bronchiectasis (CMS/HCC)     Diverticulitis of colon     GERD  (gastroesophageal reflux disease)     Hypertension     Lipid disorder     Lupus (CMS/HCC)     Stress incontinence        Past Surgical History:   Procedure Laterality Date    BREAST LUMPECTOMY Right 2015    CHOLECYSTECTOMY  2001    EYE SURGERY Right     laser for retinal hole    HYSTERECTOMY  1980       MEDICATIONS        Current Outpatient Medications:     acetaminophen (TYLENOL) 500 mg tablet, Take 500 mg by mouth every 6 (six) hours as needed., Disp: , Rfl:     acetaminophen 325 mg tablet 325 mg, diphenhydrAMINE 25 mg capsule 25 mg, Take by mouth daily., Disp: , Rfl:     azithromycin (ZITHROMAX) 250 mg tablet, Take 250 mg by mouth 3 (three) times a week (Mon, Wed, Fri)., Disp: , Rfl:     diphenhydrAMINE-acetaminophen (TYLENOL PM)  mg tablet, Take 1 tablet by mouth nightly as needed for sleep., Disp: , Rfl:     fexofenadine (ALLEGRA) 180 mg tablet, Take 180 mg by mouth in the morning., Disp: , Rfl:     fluticasone propion-salmeteroL (ADVAIR DISKUS) 250-50 mcg/dose diskus inhaler, Inhale 1 puff every evening., Disp: , Rfl:     guaiFENesin (MUCINEX) 1,200 mg tablet extended release 12hr, Take 1 tablet by mouth in the morning., Disp: , Rfl:     anastrozole (ARIMIDEX) 1 mg tablet, Take  1 mg in the morning, Disp: , Rfl:     esomeprazole (NexIUM) 40 mg capsule, Take 40 mg by mouth in the morning., Disp: , Rfl:     montelukast (SINGULAIR) 10 mg tablet, Take 10 mg by mouth in the morning., Disp: , Rfl:     olmesartan-hydrochlorothiazide (BENICAR HCT) 20-12.5 mg per tablet, Take 1 tablet by mouth in the morning., Disp: , Rfl:     simvastatin (ZOCOR) 20 mg tablet, Take 20 mg by mouth in the morning., Disp: , Rfl:     valACYclovir (VALTREX) 1 gram tablet, Take 1,000 mg by mouth as needed., Disp: , Rfl:     ALLERGIES      Shellfish derived, Iodinated contrast media, Oxycodone, Adhesive tape-silicones, and Red dye    FAMILY HISTORY      family history is not on file.    Denies any prior known family  "history of DVTs/PEs/clotting disorder    SOCIAL HISTORY      Social History     Tobacco Use    Smoking status: Former     Types: Cigarettes     Quit date:      Years since quittin.8    Smokeless tobacco: Never   Vaping Use    Vaping Use: Never used   Substance Use Topics    Alcohol use: Yes     Comment: socially/occasionally    Drug use: Never       REVIEW OF SYSTEMS      Constitution: Negative for decreased appetite, diaphoresis, fever, malaise/fatigue, weight gain and weight loss.   HENT: Negative for hearing loss.    Eyes: Negative for visual disturbance.   Cardiovascular: Negative for chest pain, dyspnea on exertion, irregular heartbeat, leg swelling, near-syncope, orthopnea, palpitations, paroxysmal nocturnal dyspnea and syncope.   Respiratory: Negative for cough, hemoptysis, shortness of breath, sleep disturbances due to breathing and snoring.    Hematologic/Lymphatic: Does not bruise/bleed easily.   Skin: Discoid lupus- no current lesions. Negative for rash.   Musculoskeletal: Negative for arthritis and joint pain. Negative for myalgias.   Gastrointestinal: Negative for heartburn, hematemesis, hematochezia and melena.   Genitourinary: See HPI. Negative for hematuria and nocturia.   Neurological: Negative for dizziness and light-headedness.   Psychiatric/Behavioral: Negative for altered mental status. The patient does not have insomnia.      All other systems reviewed and negative except as noted in HPI    PHYSICAL EXAMINATION      Visit Vitals  BP (!) 141/66 (BP Location: Right upper arm, Patient Position: Sitting)   Pulse 64   Temp 36.9 °C (98.4 °F) (Oral)   Ht 1.575 m (5' 2\")   Wt 63.2 kg (139 lb 4.8 oz)   SpO2 100%   BMI 25.48 kg/m²     Body mass index is 25.48 kg/m².    Physical Exam  Constitutional: Patient is oriented to person, place, and time. The patient appears well-developed and cooperative. No distress.   HENT:   Head: Normocephalic and atraumatic.   Mouth/Throat: Oropharynx is clear " and moist.   Eyes: Pupils are equal, round, and reactive to light. No scleral icterus.   Neck: Normal range of motion. Neck supple.   Cardiovascular: Normal rate and regular rhythm.   No murmur heard. No edema. +2 pulses.  Pulmonary/Chest: No accessory muscle usage. No tachypnea. No respiratory distress. No decreased breath sounds, wheezes, rhonchi, rales.  Abdominal: Soft. Bowel sounds are normal, exhibits no ascites, and no tenderness.   Neurological: Alert and oriented to person, place, and time.   Skin: Skin is warm, dry and intact.   Psychiatric: Has a normal mood and affect. Speech is normal and behavior is normal. Judgment and thought content normal. Cognition and memory are normal.   Nursing note and vitals reviewed.    LABS / EKG        Labs pending  Lab Results   Component Value Date    WBC 6.38 02/01/2020    HGB 12.3 02/01/2020    HCT 37.9 02/01/2020    MCV 87.3 02/01/2020     02/01/2020     Lab Results   Component Value Date    GLUCOSE 185 (H) 02/01/2020    CALCIUM 8.9 02/01/2020     02/01/2020    K 3.0 (L) 02/01/2020    CO2 29 02/01/2020     02/01/2020    BUN 15 02/01/2020    CREATININE 0.7 02/01/2020           ECG/Telemetry SR 60 BPM      ASSESSMENT AND PLAN         Patient Active Problem List   Diagnosis    Vaginal vault prolapse after hysterectomy    Urge incontinence    Stress incontinence    Full incontinence of feces    Bronchiectasis without complication (CMS/HCC)    Essential hypertension    Malignant neoplasm of nipple of breast in female (CMS/HCC)    GERD (gastroesophageal reflux disease)    Discoid lupus    Diverticulitis   Plan- ABDOMINAL SACRAL COLPOPEXY   CYSTOSCOPY POSSIBLE TVT SLING, POSSIBLE POSTERIOR VAGINAL REPAIR  Bronchiectasis is stable on current Rx  HTN, dyslipidemia controlled she will not take Olmesartan HCTZ the morning of surgery  Continue current medical management, she will take Omeprazole, Singulair, Arimidex, bring inhaler with you     In  regards to perioperative cardiac risk:  The patient denies any history of ischemic heart disease, denies any history of CHF, denies any history of CVA, is not on pre-operative treatment with insulin, and does not have a pre-operative creatinine > 2 mg/dL.   The Revised Cardiac Risk Index (RCRI) = 0 for this patient which indicates a 0.4% risk of major adverse cardiac event in the perioperative period for this intermediate risk procedure.     Further comments:  Resume supplements when OK with surgical team.  I would encourage incentive spirometry to assist with minimizing mor-operative pulmonary risk.  DVT prophylaxis and timing of such per the discretion of the surgeon.     Further comments:  STOP-BANG screening for obstructive sleep apnea = 2, which indicates the patient is at low risk for having underlying TASHA.        BERTRAND Jacob  10/27/2022

## 2022-10-27 NOTE — ASSESSMENT & PLAN NOTE
This may or may not improve with a posterior repair  The posterior repair may increase risk of de franky dysparuenia

## 2022-10-27 NOTE — ASSESSMENT & PLAN NOTE
Stage 2C vaginal prolapse, prior hysterectomy  Plan sacral colpopexy  Understands that there are increased risks for adhesions which could increase risk of bladder or ureteral injury

## 2022-10-27 NOTE — PRE-PROCEDURE INSTRUCTIONS
1. You will be called between 3pm-7pm one business day before your procedure to tell you where and when to report. If you do not receive a phone call by 7pm, please call the Admissions office at [phone].    2. Please report to Admissions or Short Procedure Unit on the day of your procedure.   Detailed directions will be given to you when you are called with arrival time.        3. Please follow the following fasting guidelines:     No solid food EIGHT HOURS prior to arrival for surgery.  Unlimited clear liquids, meaning water or PLAIN black coffee WITHOUT any milk, cream, sugar, or sweetener are permitted up to TWO HOURS prior to arrival at the hospital.    4. Early on the morning of the procedure please take your usual dose of the listed medications with a sip of water:  Omeprazole, Singulair  Bring Wixela with you  5. Other Instructions: You may brush your teeth the morning of the procedure. Rinse and spit, do not swallow.  Bring a list of your medications with dosages.  Use surgical wash as directed.     6. If you develop a cold, cough, fever, rash, or other symptom prior to the day of the procedure, please report it to your physician immediately.    7. If you need to cancel the procedure for any reason, please contact your physician.    8. Make arrangements to have someone drive you home from the procedure. If you have not arranged for transportation home, your surgery may be cancelled.     9. You may not take public transportation unless accompanied by a responsible person.    10. You may not drive a car or operate complex or potentially dangerous machinery for 24 hours following anesthesia and/or sedation.    11.  If it is medically necessary for you to have a longer stay, you will be informed as soon as the decision is made.    12. Only bring essential items to the hospital.  Do not wear or bring anything of value to the hospital including jewelry of any kind, money, or wallet. Do not wear make-up or  contact lenses.  DO NOT BRING MEDICATIONS FROM HOME unless instructed to do so. DO bring your hearing aids, glasses, and a case    13. No lotion, creams, powders, or oils on skin the morning of procedure     14. Dress in comfortable clothes.    15.  If instructed, please bring a copy of your Advanced Directive (Living Will/Durable Power of ) on the day of your procedure.     16. Patients need to quarantine from the time of PAT COVID test to day of surgery, regardless of COVID vaccine status.      17. Ensuring your safety at all times is a very important part of our [facility] Culture of Safety. After having surgery and sedation, you are at risk for falling and balance issues. Although you may feel awake, the effects of the medication can last up to 24 hours after anesthesia. If you need to use the bathroom during your recovery period, nursing staff will escort you there and stay with you to ensure your safety.    18. Refrain from drinking alcohol and smoking cigarettes for 24 hours prior to surgery.    19. Shower with antibacterial soap (Dial) the night before and morning of your procedure.  If your procedure indicates the need for CHG antiseptic wash (Bactoshield or Hibiclens), please use this instead and follow instructions as discussed at the time of your Pre-Admission Testing phone interview or visit.    Above instructions reviewed with patient and patient acknowledges understanding.    Form explained by: BERTRAND Jacob

## 2022-10-27 NOTE — PROGRESS NOTES
Visit Date: 10/27/2022     Ursula Holland is 77 y.o. female presenting for an informed consent visit for surgery. We are planning on performing abdominal sacral colpopexy, posterior colporrhaphy and pubovaginal sling.    She had a prior IRINA. She presents with vaginal prolapse, short vagina and complaints of TYLER, confirmed on UD testing    A preop risk assessment was performed.  This included a review of her allergies and current medications.  Medical clearance, when applicable was also reviewed, as well as the status of preop labs.  VTE risk was assessed.  Prior anethesia history was elicited.  Preop labs were ordered, and reviewed by me, if resulted.    It is anticipated that narcotic medication will be prescribed to the patient following her surgery both as an inpatient and outpatient. In compliance with Act 191 of 2014, the [place] PDMP was queried, during this patient encounter, and no red flags were identified, and the presribing of narcotics was judged to be safe and appropriate, and this was discussed with the patient.  The internet based query added 10 additional minutes to the face to face encounter.    PREOPERATIVE RISK ASSESSMENT    Caprini Score VTE Score: 7    Prior Blood Transfusion: Yes. Prior to 1992: Yes. ORDER Anti-HCV  Prior Problems with Anesthesia:No  Significant postop nausea: No  Prior hysterectomy: yes   Known Drug Allergies:   Allergies as of 10/27/2022 - Reviewed 10/27/2022   Allergen Reaction Noted    Adhesive tape-silicones  02/01/2020    Iodinated contrast media  02/01/2020    Oxycodone  02/01/2020    Red dye  10/10/2022    Shellfish derived  02/01/2020     Daily use of aspirin or NSAIDS: No  Known Cardiovascular disease:No  Known Diabetes:no   Known Thyroid Disease:no   Known Renal Disease:no   Prior CVA:No  Prior CHF or CAD:no   Prior Bleed problems:No  Family History of bleeding disorder: No  Smoker:  Social History     Tobacco Use   Smoking Status Former   Smokeless Tobacco Never  "    Current or Past Narcotic use:No  Medical/Cardiac clearances: N/A    Vital Signs for this encounter:   Visit Vitals  /80   Ht 1.575 m (5' 2\")   Wt 62.6 kg (138 lb)   BMI 25.24 kg/m²       The following have been marked reviewed and updated as appropriate in this visit:   Tobacco  Allergies  Meds  Problems  Med Hx  Surg Hx  Fam Hx         Review of Systems   Constitutional: Negative for chills, fatigue and unexpected weight change.   Respiratory: Negative for cough, shortness of breath and wheezing.    Cardiovascular: Negative for chest pain, nocturnal dyspnea, palpitations and leg swelling.   Gastrointestinal: Negative for abdominal distention, abdominal pain and anal bleeding.   Endocrine: Negative for cold intolerance and heat intolerance.   Musculoskeletal: Negative for gait problem and joint swelling.   Skin: Negative for rash.   Allergic/Immunologic: Negative for immunocompromised state.   Neurological: Negative for dizziness and light-headedness.   Hematological: Does not bruise/bleed easily.   Psychiatric/Behavioral: Negative for confusion and decreased concentration.     Physical Exam  Constitutional:       Appearance: She is well-developed.   Genitourinary:   Pelvic exam deferred.Neck:      Thyroid: No thyromegaly.      Vascular: No JVD.   Cardiovascular:      Comments: No peripheral edema  Pulmonary:      Effort: Pulmonary effort is normal.      Breath sounds: Normal breath sounds.   Abdominal:      General: There is no distension.      Palpations: Abdomen is soft. There is no mass.   Neurological:      Mental Status: She is alert and oriented to person, place, and time.   Skin:     General: Skin is warm and dry.      Findings: No rash.   Psychiatric:         Behavior: Behavior normal.         Judgment: Judgment normal.       Assessment/Plan   Plan:   Vaginal vault prolapse after hysterectomy  Stage 2C vaginal prolapse, prior hysterectomy  Plan sacral colpopexy  Understands that there are " increased risks for adhesions which could increase risk of bladder or ureteral injury    Stress incontinence  She understands that in order to reduce the risk of postop TYLER, we will do a TVT sling    Full incontinence of feces  This may or may not improve with a posterior repair  The posterior repair may increase risk of de franky dysparuenia    The patient presented to the office today for counseling regarding her decision for gynecologic/ reconstructive pelvic surgery.  All pertinent past evaluations and studies were reviewed.  The patient was counseled regarding alternative non-surgical options, as well as the prognosis without therapy or surgery.    Surgical options were reviewed again with the patient, including traditional open abdominal approaches,  vaginal approaches (where appropriate) as well as laparoscopic approaches.  When appropriate, non-hysterectomy alternatives were presented and discussed.  The pros and cons of newer surgical procedures were reviewed.  The pros and cons regarding the use of synthetic mesh in the treatment of stress urinary incontinence and prolapse were discussed.  Our extensive experience with these materials were reviewed, and when appropriate, the 2011 FDA public health notification was reviewed and discussed.    The general risks of surgery were reviewed including, infection, bleeding, transfusion, injury to surrounding organs and tissues, such as the bladder, bowel, rectum, urethra, ureters and the nerves and blood vessels in the pelvis.  Specific post-operative risks including, but not limited to:  as venous thromboembolism, surgical site infection, surgical failure, voiding dysfunction, urinary retention that might result in prolonged need for a serrano catheter after discharge, painful intercourse and surgical failure.  The approximate length of surgery and hospital stay were reviewed. The duration of the post-op recovery period and general overview of convalescence and  post-operative follow up were also reviewed.  The patient verbalized an understanding of the proposed procedure. Consents were signed and questions answered.    In preparation for the surgery, I reviewed the patients records prior to the visit, communicated with primary care provider when appropriate regarding medical clearance and coordination of care, reviewed and order preop labs, completed documentation within the EHR (H&P), communicated with family members to coordinate care and counseled the patient on pre and postop instructions, explained the role of our enhanced recovery protocol. All of this was completed on the date of the encounter and required a minimum of 35 minutes.

## 2022-10-27 NOTE — H&P (VIEW-ONLY)
Urogynecology Preoperative H&P    HPI:  Ursula Holland is a 77 y.o. female who was admitted to undergo surgical correction of vaginal vault prolapse and stress incontinence. She presents with a vaginal bulge and TYLER symptoms. Her TYLER is confirmed on UD testing. She previously had an abdominal hysterectomy    Medical History:   Past Medical History:   Diagnosis Date    Bronchiectasis (CMS/HCC)     Diverticulitis of colon     GERD (gastroesophageal reflux disease)     Lupus (CMS/HCC)        Surgical History: total addominal hysterectomy, breast cancer    Obstetric History:     Social History:   Social History     Tobacco Use    Smoking status: Former    Smokeless tobacco: Never   Substance Use Topics    Alcohol use: Not Currently    Drug use: Not Currently       Family History: No family history on file.    Allergies: Adhesive tape-silicones, Iodinated contrast media, Oxycodone, Red dye, and Shellfish derived    Meds:   Current Outpatient Medications:     anastrozole (ARIMIDEX) 1 mg tablet, TK 1 T PO QAM, Disp: , Rfl:     esomeprazole (NexIUM) 40 mg capsule, TK 1 C PO QAM, Disp: , Rfl:     montelukast (SINGULAIR) 10 mg tablet, TK 1 T PO D, Disp: , Rfl:     olmesartan-hydrochlorothiazide (BENICAR HCT) 20-12.5 mg per tablet, TK 1 T PO D, Disp: , Rfl:     simvastatin (ZOCOR) 20 mg tablet, TK 1 T PO Q NIGHT, Disp: , Rfl:     valACYclovir (VALTREX) 1 gram tablet, TK 1 T PO QD FOR 5 DAYS PRF FLARE, Disp: , Rfl:     Review of Systems  All other systems reviewed and negative except as noted in the HPI.    Physicial Exam  There were no vitals taken for this visit.    General Appearance:  Alert, cooperative, no distress, appears stated age   Head:  Normocephalic, without obvious abnormality, atraumatic   Eyes:  Deferred   Ears:  Deferred   Nose: Deferred   Throat: Lips, mucosa, and tongue normal   Neck: Supple, symmetrical, trachea midline, no adenopathy;   thyroid:  no enlargement/tenderness/nodules   Back:    Symmetric, no curvature, ROM normal, no CVA tenderness   Lungs:   Clear to auscultation bilaterally, respirations unlabored   Chest Wall:  No tenderness or deformity   Heart:: Regular rate and rhythm, S1 and S2 normal, no murmur, rub or gallop   Abdomen:   Soft, non-tender, non distended, bowel sounds active all four quadrants, no rebound or guarding  no masses, no organomegaly, no flank tenderness   Genitalia:  External genitalia appears normal, Urethra and meatus are normal, Bladder normal, uterus is surgically absent. Vagina is prolapsed to +1. Shortened vagina, + TYLER with prolapse reduction  Aa: 1, Ba: 1, C: 1     gH: pB: TVL: 8     Ap: -2, Bp: -2, D: x   Rectal:  Deferred   Extremities: Extremities normal, atraumatic, no cyanosis or edema   Musculoskeletal:  Pulses: No injury or deformity  2+ and symmetric all extremities   Skin: Skin color, texture, turgor normal, no rashes or lesions   Lymph nodes: Cervical, supraclavicular, and axillary nodes normal   Neurologic:  Behavior/  Emotional: Sensation is intact to light touch and pinprick along sacral dermatomes  Appropriate, cooperative     I have reviewed the patient's labs.  Current labs are within normal limits.    ASSESSMENT/PLAN    Vaginal Vault prolapse  Mixed UI  Plan sacral colpopexy and TVT sling, posterior repair  The patient has been advised as to the risks, benefits, and alternatives of the intended procedures and we have also discussed the anticipated postoperative course and recovery.  She understands that she could be discharged with a serrano catheter.  Standard SCIP VTE and antibiotic protocols will be followed.    Mitchell Pandey MD

## 2022-10-31 ENCOUNTER — APPOINTMENT (OUTPATIENT)
Dept: LAB | Age: 77
DRG: 748 | End: 2022-10-31
Attending: OBSTETRICS & GYNECOLOGY
Payer: MEDICARE

## 2022-10-31 DIAGNOSIS — Z11.52 ENCOUNTER FOR SCREENING LABORATORY TESTING FOR COVID-19 VIRUS: ICD-10-CM

## 2022-10-31 PROCEDURE — C9803 HOPD COVID-19 SPEC COLLECT: HCPCS

## 2022-10-31 PROCEDURE — U0003 INFECTIOUS AGENT DETECTION BY NUCLEIC ACID (DNA OR RNA); SEVERE ACUTE RESPIRATORY SYNDROME CORONAVIRUS 2 (SARS-COV-2) (CORONAVIRUS DISEASE [COVID-19]), AMPLIFIED PROBE TECHNIQUE, MAKING USE OF HIGH THROUGHPUT TECHNOLOGIES AS DESCRIBED BY CMS-2020-01-R: HCPCS

## 2022-11-01 LAB — SARS-COV-2 RNA RESP QL NAA+PROBE: NEGATIVE

## 2022-11-02 ENCOUNTER — ANESTHESIA EVENT (INPATIENT)
Dept: OPERATING ROOM | Facility: HOSPITAL | Age: 77
DRG: 748 | End: 2022-11-02
Payer: MEDICARE

## 2022-11-03 ENCOUNTER — ANESTHESIA (INPATIENT)
Dept: OPERATING ROOM | Facility: HOSPITAL | Age: 77
DRG: 748 | End: 2022-11-03
Payer: MEDICARE

## 2022-11-03 ENCOUNTER — HOSPITAL ENCOUNTER (INPATIENT)
Facility: HOSPITAL | Age: 77
LOS: 1 days | Discharge: HOME | DRG: 748 | End: 2022-11-04
Attending: OBSTETRICS & GYNECOLOGY | Admitting: OBSTETRICS & GYNECOLOGY
Payer: MEDICARE

## 2022-11-03 PROCEDURE — 71000011 HC PACU PHASE 1 EA ADDL MIN: Performed by: OBSTETRICS & GYNECOLOGY

## 2022-11-03 PROCEDURE — 27200000 HC STERILE SUPPLY: Performed by: OBSTETRICS & GYNECOLOGY

## 2022-11-03 PROCEDURE — C1771 REP DEV, URINARY, W/SLING: HCPCS | Performed by: OBSTETRICS & GYNECOLOGY

## 2022-11-03 PROCEDURE — 25800000 HC PHARMACY IV SOLUTIONS: Performed by: OBSTETRICS & GYNECOLOGY

## 2022-11-03 PROCEDURE — 57280 SUSPENSION OF VAGINA: CPT | Performed by: OBSTETRICS & GYNECOLOGY

## 2022-11-03 PROCEDURE — 37000001 HC ANESTHESIA GENERAL: Performed by: OBSTETRICS & GYNECOLOGY

## 2022-11-03 PROCEDURE — 63700000 HC SELF-ADMINISTRABLE DRUG: Performed by: OBSTETRICS & GYNECOLOGY

## 2022-11-03 PROCEDURE — 0TJB8ZZ INSPECTION OF BLADDER, VIA NATURAL OR ARTIFICIAL OPENING ENDOSCOPIC: ICD-10-PCS | Performed by: OBSTETRICS & GYNECOLOGY

## 2022-11-03 PROCEDURE — 25000000 HC PHARMACY GENERAL: Performed by: ANESTHESIOLOGY

## 2022-11-03 PROCEDURE — 63600000 HC DRUGS/DETAIL CODE: Performed by: NURSE ANESTHETIST, CERTIFIED REGISTERED

## 2022-11-03 PROCEDURE — 0TSD0ZZ REPOSITION URETHRA, OPEN APPROACH: ICD-10-PCS | Performed by: OBSTETRICS & GYNECOLOGY

## 2022-11-03 PROCEDURE — 12000000 HC ROOM AND CARE MED/SURG

## 2022-11-03 PROCEDURE — A4648 IMPLANTABLE TISSUE MARKER: HCPCS | Performed by: OBSTETRICS & GYNECOLOGY

## 2022-11-03 PROCEDURE — 63600000 HC DRUGS/DETAIL CODE: Performed by: ANESTHESIOLOGY

## 2022-11-03 PROCEDURE — 0UUG0JZ SUPPLEMENT VAGINA WITH SYNTHETIC SUBSTITUTE, OPEN APPROACH: ICD-10-PCS | Performed by: OBSTETRICS & GYNECOLOGY

## 2022-11-03 PROCEDURE — 57288 REPAIR BLADDER DEFECT: CPT | Performed by: OBSTETRICS & GYNECOLOGY

## 2022-11-03 PROCEDURE — 25000000 HC PHARMACY GENERAL: Performed by: OBSTETRICS & GYNECOLOGY

## 2022-11-03 PROCEDURE — 36000003 HC OR LEVEL 3 INITIAL 30MIN: Performed by: OBSTETRICS & GYNECOLOGY

## 2022-11-03 PROCEDURE — 0USG0ZZ REPOSITION VAGINA, OPEN APPROACH: ICD-10-PCS | Performed by: OBSTETRICS & GYNECOLOGY

## 2022-11-03 PROCEDURE — 25000000 HC PHARMACY GENERAL: Performed by: NURSE ANESTHETIST, CERTIFIED REGISTERED

## 2022-11-03 PROCEDURE — C1781 MESH (IMPLANTABLE): HCPCS | Performed by: OBSTETRICS & GYNECOLOGY

## 2022-11-03 PROCEDURE — 36000013 HC OR LEVEL 3 EA ADDL MIN: Performed by: OBSTETRICS & GYNECOLOGY

## 2022-11-03 PROCEDURE — 71000001 HC PACU PHASE 1 INITIAL 30MIN: Performed by: OBSTETRICS & GYNECOLOGY

## 2022-11-03 DEVICE — MESH GYNEMESH PS 10CM X 15CM: Type: IMPLANTABLE DEVICE | Site: UTERUS | Status: FUNCTIONAL

## 2022-11-03 DEVICE — DEVICE GYNECARE TVT EXACT: Type: IMPLANTABLE DEVICE | Site: UTERUS | Status: FUNCTIONAL

## 2022-11-03 RX ORDER — PROPOFOL 10 MG/ML
INJECTION, EMULSION INTRAVENOUS AS NEEDED
Status: DISCONTINUED | OUTPATIENT
Start: 2022-11-03 | End: 2022-11-03 | Stop reason: SURG

## 2022-11-03 RX ORDER — SODIUM CHLORIDE, SODIUM LACTATE, POTASSIUM CHLORIDE, CALCIUM CHLORIDE 600; 310; 30; 20 MG/100ML; MG/100ML; MG/100ML; MG/100ML
INJECTION, SOLUTION INTRAVENOUS CONTINUOUS
Status: ACTIVE | OUTPATIENT
Start: 2022-11-03 | End: 2022-11-03

## 2022-11-03 RX ORDER — MIDAZOLAM HYDROCHLORIDE 2 MG/2ML
INJECTION, SOLUTION INTRAMUSCULAR; INTRAVENOUS AS NEEDED
Status: DISCONTINUED | OUTPATIENT
Start: 2022-11-03 | End: 2022-11-03 | Stop reason: SURG

## 2022-11-03 RX ORDER — DEXTROSE 50 % IN WATER (D50W) INTRAVENOUS SYRINGE
25 AS NEEDED
Status: DISCONTINUED | OUTPATIENT
Start: 2022-11-03 | End: 2022-11-04 | Stop reason: HOSPADM

## 2022-11-03 RX ORDER — ANASTROZOLE 1 MG/1
1 TABLET ORAL DAILY
Status: DISCONTINUED | OUTPATIENT
Start: 2022-11-03 | End: 2022-11-04 | Stop reason: HOSPADM

## 2022-11-03 RX ORDER — ACETAMINOPHEN 325 MG/1
975 TABLET ORAL
Status: DISCONTINUED | OUTPATIENT
Start: 2022-11-03 | End: 2022-11-04

## 2022-11-03 RX ORDER — LIDOCAINE HCL/PF 100 MG/5ML
SYRINGE (ML) INTRAVENOUS AS NEEDED
Status: DISCONTINUED | OUTPATIENT
Start: 2022-11-03 | End: 2022-11-03 | Stop reason: SURG

## 2022-11-03 RX ORDER — ONDANSETRON 4 MG/1
4 TABLET, ORALLY DISINTEGRATING ORAL EVERY 8 HOURS PRN
Status: DISCONTINUED | OUTPATIENT
Start: 2022-11-03 | End: 2022-11-04 | Stop reason: HOSPADM

## 2022-11-03 RX ORDER — ONDANSETRON HYDROCHLORIDE 2 MG/ML
4 INJECTION, SOLUTION INTRAVENOUS
Status: DISCONTINUED | OUTPATIENT
Start: 2022-11-03 | End: 2022-11-03 | Stop reason: HOSPADM

## 2022-11-03 RX ORDER — HYDROMORPHONE HYDROCHLORIDE 1 MG/ML
INJECTION, SOLUTION INTRAMUSCULAR; INTRAVENOUS; SUBCUTANEOUS AS NEEDED
Status: DISCONTINUED | OUTPATIENT
Start: 2022-11-03 | End: 2022-11-03 | Stop reason: SURG

## 2022-11-03 RX ORDER — HYDROMORPHONE HYDROCHLORIDE 1 MG/ML
0.25 INJECTION, SOLUTION INTRAMUSCULAR; INTRAVENOUS; SUBCUTANEOUS EVERY 4 HOURS PRN
Status: DISCONTINUED | OUTPATIENT
Start: 2022-11-03 | End: 2022-11-04

## 2022-11-03 RX ORDER — PHENAZOPYRIDINE HYDROCHLORIDE 95 MG/1
95 TABLET ORAL ONCE
Status: COMPLETED | OUTPATIENT
Start: 2022-11-03 | End: 2022-11-03

## 2022-11-03 RX ORDER — HYDROMORPHONE HYDROCHLORIDE 1 MG/ML
0.5 INJECTION, SOLUTION INTRAMUSCULAR; INTRAVENOUS; SUBCUTANEOUS
Status: DISCONTINUED | OUTPATIENT
Start: 2022-11-03 | End: 2022-11-03 | Stop reason: HOSPADM

## 2022-11-03 RX ORDER — NEOSTIGMINE METHYLSULFATE 1 MG/ML
INJECTION INTRAVENOUS AS NEEDED
Status: DISCONTINUED | OUTPATIENT
Start: 2022-11-03 | End: 2022-11-03 | Stop reason: SURG

## 2022-11-03 RX ORDER — ATORVASTATIN CALCIUM 10 MG/1
10 TABLET, FILM COATED ORAL DAILY
Status: DISCONTINUED | OUTPATIENT
Start: 2022-11-03 | End: 2022-11-04 | Stop reason: HOSPADM

## 2022-11-03 RX ORDER — SODIUM CHLORIDE 9 MG/ML
INJECTION, SOLUTION INTRAVENOUS CONTINUOUS
Status: DISCONTINUED | OUTPATIENT
Start: 2022-11-03 | End: 2022-11-03

## 2022-11-03 RX ORDER — DOCUSATE SODIUM 100 MG/1
100 CAPSULE, LIQUID FILLED ORAL 2 TIMES DAILY
Status: DISCONTINUED | OUTPATIENT
Start: 2022-11-03 | End: 2022-11-04 | Stop reason: HOSPADM

## 2022-11-03 RX ORDER — ADHESIVE BANDAGE
30 BANDAGE TOPICAL 2 TIMES DAILY
Status: DISCONTINUED | OUTPATIENT
Start: 2022-11-03 | End: 2022-11-04 | Stop reason: HOSPADM

## 2022-11-03 RX ORDER — EPHEDRINE SULFATE 50 MG/ML
INJECTION, SOLUTION INTRAVENOUS AS NEEDED
Status: DISCONTINUED | OUTPATIENT
Start: 2022-11-03 | End: 2022-11-03 | Stop reason: SURG

## 2022-11-03 RX ORDER — OLMESARTAN MEDOXOMIL AND HYDROCHLOROTHIAZIDE 20/12.5 20; 12.5 MG/1; MG/1
1 TABLET ORAL DAILY
Status: DISCONTINUED | OUTPATIENT
Start: 2022-11-03 | End: 2022-11-04 | Stop reason: HOSPADM

## 2022-11-03 RX ORDER — KETOROLAC TROMETHAMINE 15 MG/ML
INJECTION, SOLUTION INTRAMUSCULAR; INTRAVENOUS AS NEEDED
Status: DISCONTINUED | OUTPATIENT
Start: 2022-11-03 | End: 2022-11-03 | Stop reason: SURG

## 2022-11-03 RX ORDER — GABAPENTIN 100 MG/1
300 CAPSULE ORAL ONCE
Status: COMPLETED | OUTPATIENT
Start: 2022-11-03 | End: 2022-11-03

## 2022-11-03 RX ORDER — HYDROMORPHONE HYDROCHLORIDE 2 MG/1
2 TABLET ORAL EVERY 4 HOURS PRN
Status: DISCONTINUED | OUTPATIENT
Start: 2022-11-03 | End: 2022-11-04 | Stop reason: HOSPADM

## 2022-11-03 RX ORDER — SODIUM CHLORIDE, SODIUM LACTATE, POTASSIUM CHLORIDE, CALCIUM CHLORIDE 600; 310; 30; 20 MG/100ML; MG/100ML; MG/100ML; MG/100ML
INJECTION, SOLUTION INTRAVENOUS CONTINUOUS PRN
Status: DISCONTINUED | OUTPATIENT
Start: 2022-11-03 | End: 2022-11-03 | Stop reason: SURG

## 2022-11-03 RX ORDER — DEXAMETHASONE SODIUM PHOSPHATE 4 MG/ML
INJECTION, SOLUTION INTRA-ARTICULAR; INTRALESIONAL; INTRAMUSCULAR; INTRAVENOUS; SOFT TISSUE AS NEEDED
Status: DISCONTINUED | OUTPATIENT
Start: 2022-11-03 | End: 2022-11-03 | Stop reason: SURG

## 2022-11-03 RX ORDER — PANTOPRAZOLE SODIUM 20 MG/1
20 TABLET, DELAYED RELEASE ORAL DAILY
Status: DISCONTINUED | OUTPATIENT
Start: 2022-11-03 | End: 2022-11-04 | Stop reason: HOSPADM

## 2022-11-03 RX ORDER — FENTANYL CITRATE 50 UG/ML
50 INJECTION, SOLUTION INTRAMUSCULAR; INTRAVENOUS EVERY 5 MIN PRN
Status: DISCONTINUED | OUTPATIENT
Start: 2022-11-03 | End: 2022-11-03 | Stop reason: HOSPADM

## 2022-11-03 RX ORDER — CELECOXIB 100 MG/1
100 CAPSULE ORAL 2 TIMES DAILY
Status: DISCONTINUED | OUTPATIENT
Start: 2022-11-03 | End: 2022-11-04 | Stop reason: HOSPADM

## 2022-11-03 RX ORDER — ONDANSETRON HYDROCHLORIDE 2 MG/ML
4 INJECTION, SOLUTION INTRAVENOUS EVERY 8 HOURS PRN
Status: DISCONTINUED | OUTPATIENT
Start: 2022-11-03 | End: 2022-11-04

## 2022-11-03 RX ORDER — GLYCOPYRROLATE 0.6MG/3ML
SYRINGE (ML) INTRAVENOUS AS NEEDED
Status: DISCONTINUED | OUTPATIENT
Start: 2022-11-03 | End: 2022-11-03 | Stop reason: SURG

## 2022-11-03 RX ORDER — FENTANYL CITRATE 50 UG/ML
INJECTION, SOLUTION INTRAMUSCULAR; INTRAVENOUS AS NEEDED
Status: DISCONTINUED | OUTPATIENT
Start: 2022-11-03 | End: 2022-11-03 | Stop reason: SURG

## 2022-11-03 RX ORDER — IBUPROFEN 200 MG
16-32 TABLET ORAL AS NEEDED
Status: DISCONTINUED | OUTPATIENT
Start: 2022-11-03 | End: 2022-11-04 | Stop reason: HOSPADM

## 2022-11-03 RX ORDER — ROCURONIUM BROMIDE 10 MG/ML
INJECTION, SOLUTION INTRAVENOUS AS NEEDED
Status: DISCONTINUED | OUTPATIENT
Start: 2022-11-03 | End: 2022-11-03 | Stop reason: SURG

## 2022-11-03 RX ORDER — MONTELUKAST SODIUM 10 MG/1
10 TABLET ORAL NIGHTLY
Status: DISCONTINUED | OUTPATIENT
Start: 2022-11-03 | End: 2022-11-04 | Stop reason: HOSPADM

## 2022-11-03 RX ORDER — ACETAMINOPHEN 325 MG/1
650 TABLET ORAL ONCE
Status: COMPLETED | OUTPATIENT
Start: 2022-11-03 | End: 2022-11-03

## 2022-11-03 RX ORDER — DEXTROSE 40 %
15-30 GEL (GRAM) ORAL AS NEEDED
Status: DISCONTINUED | OUTPATIENT
Start: 2022-11-03 | End: 2022-11-04 | Stop reason: HOSPADM

## 2022-11-03 RX ORDER — ONDANSETRON HYDROCHLORIDE 2 MG/ML
INJECTION, SOLUTION INTRAVENOUS AS NEEDED
Status: DISCONTINUED | OUTPATIENT
Start: 2022-11-03 | End: 2022-11-03 | Stop reason: SURG

## 2022-11-03 RX ADMIN — Medication 95 MG: at 05:21

## 2022-11-03 RX ADMIN — DOCUSATE SODIUM 100 MG: 100 CAPSULE, LIQUID FILLED ORAL at 20:55

## 2022-11-03 RX ADMIN — FENTANYL CITRATE 100 MCG: 50 INJECTION, SOLUTION INTRAMUSCULAR; INTRAVENOUS at 07:44

## 2022-11-03 RX ADMIN — KETOROLAC TROMETHAMINE 15 MG: 15 INJECTION, SOLUTION INTRAMUSCULAR; INTRAVENOUS at 10:26

## 2022-11-03 RX ADMIN — PROPOFOL INJECTABLE EMULSION 100 MG: 10 INJECTION, EMULSION INTRAVENOUS at 07:44

## 2022-11-03 RX ADMIN — CELECOXIB 100 MG: 100 CAPSULE ORAL at 20:54

## 2022-11-03 RX ADMIN — HYDROMORPHONE HYDROCHLORIDE 0.5 MG: 1 INJECTION, SOLUTION INTRAMUSCULAR; INTRAVENOUS; SUBCUTANEOUS at 08:10

## 2022-11-03 RX ADMIN — GABAPENTIN 300 MG: 300 CAPSULE ORAL at 05:31

## 2022-11-03 RX ADMIN — HYDROMORPHONE HYDROCHLORIDE 2 MG: 2 TABLET ORAL at 14:16

## 2022-11-03 RX ADMIN — DEXAMETHASONE SODIUM PHOSPHATE 8 MG: 4 INJECTION, SOLUTION INTRA-ARTICULAR; INTRALESIONAL; INTRAMUSCULAR; INTRAVENOUS; SOFT TISSUE at 07:48

## 2022-11-03 RX ADMIN — HYDROMORPHONE HYDROCHLORIDE 2 MG: 2 TABLET ORAL at 23:57

## 2022-11-03 RX ADMIN — LIDOCAINE HYDROCHLORIDE 60 MG: 20 INJECTION, SOLUTION INTRAVENOUS at 07:46

## 2022-11-03 RX ADMIN — FENTANYL CITRATE 50 MCG: 50 INJECTION, SOLUTION INTRAMUSCULAR; INTRAVENOUS at 11:27

## 2022-11-03 RX ADMIN — ROCURONIUM BROMIDE 40 MG: 10 INJECTION, SOLUTION INTRAVENOUS at 07:46

## 2022-11-03 RX ADMIN — ACETAMINOPHEN 650 MG: 325 TABLET ORAL at 05:21

## 2022-11-03 RX ADMIN — MIDAZOLAM HYDROCHLORIDE 2 MG: 1 INJECTION, SOLUTION INTRAMUSCULAR; INTRAVENOUS at 07:30

## 2022-11-03 RX ADMIN — MAGNESIUM HYDROXIDE 30 ML: 400 SUSPENSION ORAL at 20:55

## 2022-11-03 RX ADMIN — EPHEDRINE SULFATE 5 MG: 50 INJECTION, SOLUTION INTRAVENOUS at 07:44

## 2022-11-03 RX ADMIN — Medication 3 MG: at 09:52

## 2022-11-03 RX ADMIN — ACETAMINOPHEN 975 MG: 325 TABLET ORAL at 20:54

## 2022-11-03 RX ADMIN — CEFOTETAN AND DEXTROSE 1 G: 1 INJECTION, SOLUTION INTRAVENOUS at 07:50

## 2022-11-03 RX ADMIN — MONTELUKAST 10 MG: 10 TABLET, FILM COATED ORAL at 21:00

## 2022-11-03 RX ADMIN — GLYCOPYRROLATE 0.6 MG: 0.2 INJECTION, SOLUTION INTRAMUSCULAR; INTRAVENOUS at 09:52

## 2022-11-03 RX ADMIN — ONDANSETRON HYDROCHLORIDE 4 MG: 2 INJECTION, SOLUTION INTRAMUSCULAR; INTRAVENOUS at 08:32

## 2022-11-03 RX ADMIN — SODIUM CHLORIDE, POTASSIUM CHLORIDE, SODIUM LACTATE AND CALCIUM CHLORIDE: 600; 310; 30; 20 INJECTION, SOLUTION INTRAVENOUS at 07:34

## 2022-11-03 NOTE — PLAN OF CARE
Problem: Adult Inpatient Plan of Care  Goal: Plan of Care Review  Outcome: Progressing  Flowsheets (Taken 11/3/2022 1032)  Progress: improving  Plan of Care Reviewed With:   patient   spouse  Outcome Summary: Pt aaox3, minimal pain. Peripad in place with small drainage. Post op VS documented. Incisions cdi. Masters in place draining clear orange urine. Pt tolerating diet. Call bell in reach, safety precautions in place.   Plan of Care Review  Plan of Care Reviewed With: patient, spouse  Progress: improving  Outcome Summary: Pt aaox3, minimal pain. Peripad in place with small drainage. Post op VS documented. Incisions cdi. Masters in place draining clear orange urine. Pt tolerating diet. Call bell in reach, safety precautions in place.

## 2022-11-03 NOTE — OP NOTE
UROGYNECOLOGY OPERATIVE REPORT    ABDOMINAL SACRAL COLPOPEXY CYSTOSCOPY , SLING TVT TRANSVAGINAL TENSION FREE TAPE Procedure Note       Ursula Holland  11/3/2022    Procedure:       1  ABDOMINAL SACRAL COLPOPEXY (05284)  2  PUBOVAGINAL   SLING TVT (66085 -51)        Pre-op Diagnosis     * Vaginal vault prolapse after hysterectomy [N99.3]     * Stress incontinence [N39.3]    Post-op Diagnosis     * Vaginal vault prolapse after hysterectomy [N99.3]     * Stress incontinence [N39.3]    Surgeon(s) and Role:     * Mitchell Pandey MD - Primary     * Savannah Gustafson MD - Resident - Assisting    Anesthesia:  General    Anesthesiologist:  Anesthesiologist: Trina Ruelas MD  CRNA: Camelia Brush CRNA    Operative Indications:  *77 y.o. wf who is referred for surgical management of vaginal vault prolapse and stress incontinence. She has a shortened, prolapsed vagina. She is still sexually active, therefore a sacral colpopexy was discussed as the gold standard surgically    Procedure Details   *Ursula in the preoperative holding area where she was identified by her ID martin.  I reviewed with her the intended procedures.  She was then brought to the operating room where she underwent general anesthesia without difficulty and she was positioned in dorsolithotomy.  Exam under anesthesia confirmed the preoperative findings.  There was a shortened prolapsed vagina that extended 1 to 2 cm beyond the vaginal introitus.  The vaginal apex was somewhat narrowed.  The uterus and cervix were surgically absent.  The vagina and lower abdomen were separately prepped and draped in usual sterile manner a 16 Italian Masters catheter was placed.  She was repositioned in low lithotomy for the duration of the procedure.    We began the operation by making an 8 cm mini laparotomy transversely through her previous hysterectomy scar.  This was approximately 2 cm above the pubic symphysis.  Sharp dissection was carried out down to the fascia  the fascia was incised in the midline and the incisions carried bilaterally.  We then dissected the rectus muscle off of the overlying fascia cephalad and caudal planes.  We entered the peritoneum in the midline extending the incision both cephalad and caudally.  We packed the bowels above the sacrum with 3 wet sponges and a wet towel.  The uterus tubes and ovaries were all noted to be surgically absent.  A large EEA sizer was placed within the vagina and this was used to elevate the prolapsed vaginal vault.  The bladder was noted to be densely adherent to the vaginal apex and appeared to be restricting its length.  Careful sharp dissection was carried out by raising the bladder up off of the anterior vaginal wall and dissecting down bluntly to about the level of the trigone.  A piece of Gynemesh was then cut to fit the dimensions of the anterior vaginal wall this was sutured in place with 3 rows of interrupted 2-0 PDS suture.  Next I opened up the rectovaginal septum and exposed the posterior vaginal wall.  Second piece of mesh was cut to fit the dimensions of the posterior vaginal wall.  Posterior vaginal wall was noted to be very thin and I noted a 2 cm vaginotomy along the posterior vaginal wall about a third the way down from the apex.  Once this was recognized this was repaired with a running 3-0 Vicryl suture.  I made the decision that I would attach the mesh posteriorly well above this repair to minimize the risk of future mesh exposure.  Once the posterior mesh was attached the 2 edges of the mesh were brought together and attached to their edges with hemoclips.    Next the sigmoid colon was deviated across the midline towards the left side to expose the presacral space.  I opened the peritoneum overlying the sacral promontory with the Bovie to expose the anterior longitudinal ligament.  The presacral space was developed by extending the peritoneal dissection dissection down into the cul-de-sac.  Several 0  Ethibond sutures were placed through the anterior longitudinal ligament at the level of S1.  The mesh was brought up through the defect in the peritoneum and attached to the sacrum with the 2 Ethibond sutures.  Finally I reperitonealized over the mesh in the midline with a running nonlocking 2-0 Vicryl being mindful of the course of the right pelvic ureter.    This point all instruments and sponges were removed hemostasis was observed to be excellent.  The peritoneum was closed with 2-0 Vicryl and the fascia closed with 0 PDS in a running manner tied in the midline.  Subcutaneous space was irrigated reapproximated with 2-0 Vicryl and skin reapproximated with 4-0 Monocryl.    The vagina was inspected the vagina had a length of 10 cm it was well supported at the apex and along the anterior vaginal wall.  There is no active bleeding observed.  We inspected the area of the prior vaginotomy along the posterior wall and this appeared to be intact and there is no exposure of mesh.  I placed an additional suture to ensure that the epithelial edges were well approximated and this was 2-0 Vicryl.  Cystoscopy was performed I filled the bladder with 20 cc normal saline and I confirmed patency of each ureteral orifice by observing the brisk E flux of orange stained urine from each side.  There is no observed injury to the bladder.    Next we set up for the TVT sling.  I marked the exit points for the sling just above the pubic symphysis 2 cm on either side of midline.  Dilute Marcaine solution was injected at each incision site down into the rectus fascia followed by a small skin incision.  An 18 Czech Masters catheter then replaced a 16 Czech Masters the urethral length was measured a dilute Marcaine solution was injected into the vaginal epithelium underlying the mid urethra.  A 1.5 cm incision was made 1.5 cm bladder from the external urethral meatus.  Additional local was injected bilaterally along the periurethral sulci up to  the urogenital diaphragm.  This was followed by sharp and blunt dissection to break the lateral tunnels for the sling.  I ensure the bladder was fully drained a rigid guide was inserted through the Masters this was first deviated towards the left thigh to facilitate placement of the left TVT cannula.  It was then deviated to the right thigh for the placement of the right TVT cannula.  Once both cannulas were placed cystoscopy was again repeated we noted no injury to either the bladder or the urethra by either cannula and there is no blood within the bladder.  Sling was brought up flush with the periurethral tissue.  The sling was stabilized beneath the mid urethra and the sleeves were removed.  These were then trimmed and the incisions closed with 4 Monocryl.  The vaginal incision was inspected there was no active bleeding.  This was closed with a running nonlocking 2-0 Vicryl.    End of procedure the vagina is well suspended there was no active vaginal bleeding.  It was determined that the posterior vaginal repair would not be necessary a vaginal pack was placed and the patient discharged to recovery room in stable condition.    Findings:  Vagina prolapsed to +2 at apex, shortened to 8 cm, narrow at apex  Small posterior vaginotomy was recognized and repaired prior to fixation of posterior mesh    Cystoscopy was performed, both ureters were observed to be patent, no bladder or urethral injury was noted.  This was done both prior to sling to confirm patency and after sling placement to confirm no injury to the bladder      Estimated Blood Loss:  150 mL           Drains:   Urethral Catheter Latex 16 Fr (Active)       Packs: Vaginal pack placed           Total IV Fluids: 1200 ml           Specimens: * No specimens in log *           Implants:   Implant Name Type Inv. Item Serial No.  Lot No. LRB No. Used Action   MESH GYNEMESH PS 10CM X 15CM - [id number] Mesh Surgical MESH GYNEMESH PS 10CM X 15CM  GYNECARE  ANNIE N/A 1 Implanted   DEVICE GYNECARE TVT EXACT - [id number]  DEVICE GYNECARE TVT EXACT  GYNECARE [id number] N/A 1 Implanted             Complications:  none           Disposition: PACU - hemodynamically stable.           Condition: stable    Mitchell Pandey MD  Phone Number: [phone]

## 2022-11-03 NOTE — OR SURGEON
Pre-Procedure patient identification:  I am the primary operating surgeon/proceduralist and I have identified the patient on 11/03/22 at 7:13 AM Mitchell Pandey MD  Phone Number: [phone]

## 2022-11-03 NOTE — ANESTHESIOLOGIST PRE-PROCEDURE ATTESTATION
Pre-Procedure Patient Identification:  I am the Primary Anesthesiologist and have identified the patient on 11/03/22 at 7:11 AM.   I have confirmed the procedure(s) will be performed by the following surgeon/proceduralist Mitchell Pandey MD.

## 2022-11-03 NOTE — NURSING NOTE
Pt admitted from PACU. Report from BEN Lyons. S/p abdominal sacral colpopexy cysto with sling. Vaginal packing in place. Meagan pad intact. Masters draining orange. Lower abd incision covered x3 dressing CDI. Alert and oriented. VSS. C/o abd pain 4/10-denies pain meds at this time. States needs met. Call light within reach.  at bedside.

## 2022-11-03 NOTE — ANESTHESIA PROCEDURE NOTES
Airway  Urgency: elective    Start Time: 11/3/2022 7:46 AM  Airway not difficult    General Information and Staff    Patient location during procedure: OR  Anesthesiologist: Trina Ruelas MD  Resident/CRNA: Camelia Brush CRNA  Performed: resident/CRNA     Indications and Patient Condition  Indications for airway management: anesthesia  Sedation level: deep  Preoxygenated: yes  Patient position: sniffing  Mask difficulty assessment: 1 - vent by mask    Final Airway Details  Final airway type: endotracheal airway      Successful airway: ETT     Successful intubation technique: direct laryngoscopy  Facilitating devices/methods: intubating stylet  Endotracheal tube insertion site: oral  Blade: Suha  Blade size: #3  ETT size (mm): 7.0  Placement verified by: chest auscultation and capnometry   Measured from: teeth  ETT to teeth (cm): 21  Number of attempts at approach: 1  Number of other approaches attempted: 0  Atraumatic airway insertion

## 2022-11-03 NOTE — NURSING NOTE
1645- attempted to get pt oob, with assist x2 pt sat on edge of bed. Pt states she is feeling dizzy, stood up briefly, unable to go for walk. Pt safely assisted back to bed. Call bell in reach, safety precautions in place.

## 2022-11-03 NOTE — ANESTHESIA POSTPROCEDURE EVALUATION
Patient: Ursula Holland    Procedure Summary     Date: 11/03/22 Room / Location:  OR  /  OR    Anesthesia Start: 0730 Anesthesia Stop: 1056    Procedures:       ABDOMINAL SACRAL COLPOPEXY CYSTOSCOPY       SLING TVT TRANSVAGINAL TENSION FREE TAPE Diagnosis:       Vaginal vault prolapse after hysterectomy      Stress incontinence      (Vaginal vault prolapse after hysterectomy [N99.3])      (Stress incontinence [N39.3])    Surgeons: Mitchell Pandey MD Responsible Provider: Trina Ruelas MD    Anesthesia Type: general ASA Status: 3          Anesthesia Type: general  PACU Vitals  11/3/2022 1047 - 11/3/2022 1147      11/3/2022  1100 11/3/2022  1101 11/3/2022  1115 11/3/2022  1130    BP: 124/62 -- 121/55 121/55    Temp: -- 36.3 °C (97.3 °F) -- --    Pulse: 57 55 55 59    Resp: 13 16 13 14    SpO2: 96 % 95 % 97 % 95 %          Vitals:    11/03/22 1300   BP: (!) 117/58   Pulse: (!) 54   Resp: (!) 8   Temp:    SpO2: 100%       Anesthesia Post Evaluation    Pain management: adequate  Mode of pain management: IV medication  Patient location during evaluation: PACU  Patient participation: complete - patient participated  Level of consciousness: awake and alert  Cardiovascular status: acceptable  Airway Patency: adequate  Respiratory status: acceptable  Hydration status: acceptable  Anesthetic complications: no

## 2022-11-03 NOTE — ANESTHESIA PREPROCEDURE EVALUATION
Relevant Problems   CARDIOVASCULAR   (+) Essential hypertension      GASTROINTESTINAL   (+) GERD (gastroesophageal reflux disease)      Other   (+) Malignant neoplasm of nipple of breast in female (CMS/HCC)       Anesthesia ROS/MED HX    Anesthesia History    Previous anesthetics  No history of anesthetic complications  Cardiovascular   dyslipidemia   hypertension   ECG reviewed  GI/Hepatic   Hiatal hernia   GERD  Endo/Other  History of cancer  Body Habitus: Normal  ROS/MED HX Comments:    Pulmonary: Bronchiectasis        Patient Active Problem List   Diagnosis    Vaginal vault prolapse after hysterectomy    Urge incontinence    Stress incontinence    Full incontinence of feces    Bronchiectasis without complication (CMS/HCC)    Essential hypertension    Malignant neoplasm of nipple of breast in female (CMS/HCC)    GERD (gastroesophageal reflux disease)    Discoid lupus    Diverticulitis     Past Medical History:   Diagnosis Date    Breast cancer (CMS/HCC)     right lumpectomy, radiation    Bronchiectasis (CMS/HCC)     Diverticulitis of colon     GERD (gastroesophageal reflux disease)     Hypertension     Lipid disorder     Lupus (CMS/HCC)     Stress incontinence      Past Surgical History:   Procedure Laterality Date    BREAST LUMPECTOMY Right 2015    CHOLECYSTECTOMY  2001    EYE SURGERY Right     laser for retinal hole    HYSTERECTOMY  1980         Prior to Admission medications    Medication Sig Start Date End Date Taking? Authorizing Provider   anastrozole (ARIMIDEX) 1 mg tablet Take  1 mg in the morning 11/26/19  Yes Puriv Lawler MD   azithromycin (ZITHROMAX) 250 mg tablet Take 250 mg by mouth 3 (three) times a week (Mon, Wed, Fri).   Yes Alesha Lawler MD   esomeprazole (NexIUM) 40 mg capsule Take 40 mg by mouth in the morning. 11/26/19  Yes Purvi Lawler MD   fexofenadine (ALLEGRA) 180 mg tablet Take 180 mg by mouth in the morning.   Yes Alesha Lawler  MD Purvi   fluticasone propion-salmeteroL (ADVAIR DISKUS) 250-50 mcg/dose diskus inhaler Inhale 1 puff every evening.   Yes Alesha Lawler MD   guaiFENesin (MUCINEX) 1,200 mg tablet extended release 12hr Take 1 tablet by mouth in the morning.   Yes Alesha Lawler MD   montelukast (SINGULAIR) 10 mg tablet Take 10 mg by mouth in the morning. 8/25/17  Yes Purvi Lawler MD   simvastatin (ZOCOR) 20 mg tablet Take 20 mg by mouth in the morning. 12/3/19  Yes Purvi Lawler MD   valACYclovir (VALTREX) 1 gram tablet Take 1,000 mg by mouth as needed. 1/15/20  Yes Purvi Lawler MD   acetaminophen (TYLENOL) 500 mg tablet Take 500 mg by mouth every 6 (six) hours as needed.    ProviderAlesha MD   acetaminophen 325 mg tablet 325 mg, diphenhydrAMINE 25 mg capsule 25 mg Take by mouth daily.    ProviderAlesha MD   diphenhydrAMINE-acetaminophen (TYLENOL PM)  mg tablet Take 1 tablet by mouth nightly as needed for sleep.    ProviderAlesha MD   olmesartan-hydrochlorothiazide (BENICAR HCT) 20-12.5 mg per tablet Take 1 tablet by mouth in the morning. 11/26/19   Purvi Lawler MD       CBC Results       10/27/22 02/01/20     1523 1342    WBC 5.89 6.38    RBC 4.23 4.34    HGB 11.9 12.3    HCT 37.0 37.9    MCV 87.5 87.3    MCH 28.1 28.3    MCHC 32.2 32.5     186          BMP Results       10/27/22 02/01/20     1523 1342     139    K 3.6 3.0    Cl 101 100    CO2 31 29    Glucose 95 185    BUN 14 15    Creatinine 0.6 0.7    Calcium 9.2 8.9    Anion Gap 6 10    EGFR >60.0 >60.0         Comment for K at 1342 on 02/01/20:   Results obtained on plasma. Plasma Potassium values may be up to 0.4 mEQ/L less than serum values. The differences may be greater for patients with high platelet or white cell counts.          Physical Exam    Airway   Mallampati: II   TM distance: >3 FB   Neck ROM: full  Cardiovascular    Rhythm: regular   Rate:  normalPulmonary - normal   clear to auscultation  Dental    Teeth Problems: chipped        Anesthesia Plan    Plan: general    Technique: general endotracheal     Lines and Monitors: PIV     Airway: oral intubation   ASA 3  Blood Products:   Use of Blood Products Discussed: No   Anesthetic plan and risks discussed with: patient  Induction:    intravenous   Postop Plan:   Patient Disposition: inpatient floor planned admission   Pain Management: IV analgesics

## 2022-11-04 VITALS
HEIGHT: 62 IN | WEIGHT: 139.1 LBS | RESPIRATION RATE: 18 BRPM | BODY MASS INDEX: 25.6 KG/M2 | SYSTOLIC BLOOD PRESSURE: 98 MMHG | OXYGEN SATURATION: 93 % | HEART RATE: 79 BPM | DIASTOLIC BLOOD PRESSURE: 50 MMHG | TEMPERATURE: 97.4 F

## 2022-11-04 LAB
ANION GAP SERPL CALC-SCNC: 8 MEQ/L (ref 3–15)
BUN SERPL-MCNC: 19 MG/DL (ref 8–20)
CALCIUM SERPL-MCNC: 8.5 MG/DL (ref 8.9–10.3)
CHLORIDE SERPL-SCNC: 97 MEQ/L (ref 98–109)
CO2 SERPL-SCNC: 30 MEQ/L (ref 22–32)
CREAT SERPL-MCNC: 0.8 MG/DL (ref 0.6–1.1)
ERYTHROCYTE [DISTWIDTH] IN BLOOD BY AUTOMATED COUNT: 13.2 % (ref 11.7–14.4)
GFR SERPL CREATININE-BSD FRML MDRD: >60 ML/MIN/1.73M*2
GLUCOSE SERPL-MCNC: 139 MG/DL (ref 70–99)
HCT VFR BLDCO AUTO: 30.8 % (ref 35–45)
HGB BLD-MCNC: 10 G/DL (ref 11.8–15.7)
MCH RBC QN AUTO: 28.4 PG (ref 28–33.2)
MCHC RBC AUTO-ENTMCNC: 32.5 G/DL (ref 32.2–35.5)
MCV RBC AUTO: 87.5 FL (ref 83–98)
PDW BLD AUTO: 10.6 FL (ref 9.4–12.3)
PLATELET # BLD AUTO: 193 K/UL (ref 150–369)
POTASSIUM SERPL-SCNC: 4.2 MEQ/L (ref 3.6–5.1)
RBC # BLD AUTO: 3.52 M/UL (ref 3.93–5.22)
SODIUM SERPL-SCNC: 135 MEQ/L (ref 136–144)
WBC # BLD AUTO: 14.48 K/UL (ref 3.8–10.5)

## 2022-11-04 PROCEDURE — 63700000 HC SELF-ADMINISTRABLE DRUG: Performed by: OBSTETRICS & GYNECOLOGY

## 2022-11-04 PROCEDURE — 36415 COLL VENOUS BLD VENIPUNCTURE: CPT | Performed by: OBSTETRICS & GYNECOLOGY

## 2022-11-04 PROCEDURE — 80048 BASIC METABOLIC PNL TOTAL CA: CPT | Performed by: OBSTETRICS & GYNECOLOGY

## 2022-11-04 PROCEDURE — 85027 COMPLETE CBC AUTOMATED: CPT | Performed by: OBSTETRICS & GYNECOLOGY

## 2022-11-04 PROCEDURE — 99024 POSTOP FOLLOW-UP VISIT: CPT | Performed by: OBSTETRICS & GYNECOLOGY

## 2022-11-04 RX ORDER — HYDROMORPHONE HYDROCHLORIDE 2 MG/1
2 TABLET ORAL EVERY 4 HOURS PRN
Qty: 10 TABLET | Refills: 0 | Status: SHIPPED | OUTPATIENT
Start: 2022-11-04 | End: 2022-11-09

## 2022-11-04 RX ORDER — ACETAMINOPHEN 325 MG/1
650 TABLET ORAL
Status: DISCONTINUED | OUTPATIENT
Start: 2022-11-04 | End: 2022-11-04 | Stop reason: HOSPADM

## 2022-11-04 RX ADMIN — ACETAMINOPHEN 650 MG: 325 TABLET ORAL at 14:15

## 2022-11-04 RX ADMIN — ACETAMINOPHEN 975 MG: 325 TABLET ORAL at 02:59

## 2022-11-04 RX ADMIN — PANTOPRAZOLE SODIUM 20 MG: 20 TABLET, DELAYED RELEASE ORAL at 08:56

## 2022-11-04 RX ADMIN — ANASTROZOLE 1 MG: 1 TABLET, COATED ORAL at 08:55

## 2022-11-04 RX ADMIN — CELECOXIB 100 MG: 100 CAPSULE ORAL at 08:56

## 2022-11-04 RX ADMIN — ACETAMINOPHEN 975 MG: 325 TABLET ORAL at 08:57

## 2022-11-04 RX ADMIN — ONDANSETRON 4 MG: 4 TABLET, ORALLY DISINTEGRATING ORAL at 03:19

## 2022-11-04 RX ADMIN — MAGNESIUM HYDROXIDE 30 ML: 400 SUSPENSION ORAL at 08:56

## 2022-11-04 RX ADMIN — DOCUSATE SODIUM 100 MG: 100 CAPSULE, LIQUID FILLED ORAL at 08:56

## 2022-11-04 RX ADMIN — ATORVASTATIN CALCIUM 10 MG: 10 TABLET, FILM COATED ORAL at 08:56

## 2022-11-04 NOTE — DISCHARGE INSTRUCTIONS
DISCHARGE INSTRUCTIONS   For Serrano Catheter     It is not unusual for women not to be able to empty their bladder adequately after reconstructive vaginal surgery. In these situations, patients will be discharged to home with a serrano catheter for a short period of time. Normal bladder function will typically return within 3 - 7 days.  If you are discharged with a serrano catheter, we will have you return to the office in 3-7 days to check on your bladder function. Here is how to care for your catheter:  Do not soak in a bathtub while the catheter is in place. Do shower at least once a day and wash the catheter and your perineum (vulva/ anal areas) with soap (e.g. unscented ivory, unscented dial or surgical soap) and warm water.     Please keep your bowels soft as this will help your bladder work better (see constipation hand out)     If you have pain from the catheter you may try AZO over the counter (your urine will turn bright yellow), if this does not help your pain please call us right away     You may use a catheter plug, a leg bag or a large bag: please choose which ever is easier for you.     Seeing a dark colored urine or small clots can be normal. Do not worry about this.     Remember to drink plenty of fluid to keep your urine a light yellow color.     Do not worry about how much urine is in the bag. The amount of urine you make will change based on what you are eating and drinking.     You may be asked to take an antibiotic for 3 days prior to your office visit- this is to treat any bacteria that may be in your bladder. Please take the anti-biotics as described starting 3 days prior to your office visit..     Remember you are not alone. We are here to help, please call the office and ask to speak with one of the nurses. After hours and on weekends you can call and speak with a doctor. Please, do not hesitate to call with [phone]     Women sometime worry about developing a urinary tract  infection with the serrano catheter is in. Oftentimes, your physician will prescribe an antibiotic on the day the catheter is removed to minimize this risk. Sometimes an antibiotic will be prescribed while the catheter is in place, although nowadays, the medical evidence supports either giving the antibiotic after removing the catheter or not giving a antibiotic at all.    Catheter plug tips:  Make sure you drain your bladder when you feel the urge to urinate or if you have gone 4 hrs without empting your bladder.  Remember to wipe the catheter and the plug with alcohol before you replace the cap    Leg bag tips:   Remember to drain the bag when it is half way full so it does not act a weight and pull on your bladder     release the support straps when you sleep to ensure good circulation     Consider switching to a large bag when you sleep    Large Bag tips:   Remember to drain the bag when it is half way full so it does not act a weight and pull on your bladder     Keep the bag lower than waste level whenever possible     It is common for the serrano to get kinked or pulled when you sleep. If this happens you may leak urine. Do not worry. As soon as you notice the tube is kinked, unkink it and make sure the bag is lower than your bladder. The bag should start to fill.     POST OPERATIVE INSTRUCTIONS   FOR PROLAPSE SURGERY    Call Dr. Pandey at [phone] with any problems.   DO NOT USE THE PORTAL FOR ANY POST OPERATIVE QUESTIONS.  Please call the office to schedule an appointment 4 weeks postop.    Please do not travel extended distances for the first 7-10 days post op in case you are in need of care after your surgery.    We have sent a prescription for narcotics, as we anticipate you may need it based on your narcotic use in hospital. If you feel that the pain medication regimen recommended to you is not controlling your pain, please call the office to let us know.    Now that your surgery is over, lets focus  on the important role that you must play in your recovery and towards the long term success of the surgery.  Be patient, it takes time for your tissues to heal, gain strength and provide the strength that your body needs.  Refraining from or limiting certain activities may improve your chances of enjoying lifelong success from this operation.  In general, we recommend that you restrict certain activities for at least six weeks following your surgery.     WHAT TO EXPECT AFTER SURGERY  It is normal to experience some vaginal bleeding daily after surgery for 1-3 weeks.  Typically, this will be minimal, but will require the use of a pad.  Unless instructed otherwise, you should avoid using tampons.  It is also normal to have a dark vaginal discharge for up to 6 weeks after surgery.  This discharge may be sticky, or have a slight odor, and could feel a bit itchy.  If you feel that the discharge is heavy or has a strong odor, or are experiencing significant discomfort, contact our office.    If you had abdominal surgery, you can remove the surgical bandage two days after your surgery. The incision has been closed with absorbable sutures.  The incision may be slightly bruised or have a slight drainage.  Call if you notice significant redness, pus like drainage or a significant increase in tenderness     It is ok to shower after discharge from the hospital, and get your incision(s) wet.  In general, you should avoid taking tub baths, hot tub, or going in a pool for about two weeks after surgery, or longer if you are so instructed.     It is normal to feel tired or fatigued after surgery, and this may last several weeks.  It is also normal to feel a little depressed.  However, if you feel this is not improving over time,  contact our office or your primary care provider.     It is not normal to experience fever or chills after surgery.  If you think that you have a fever or feel warmer than normal, check your temperature  with a thermometer by mouth, and contact us if it is 100.4 F or greater.     Try to eat a well balanced diet, high in protein and fiber.  If you find that your appetite is poor or you experience significant nausea or develop vomiting, contact the office.     Do not have sexual intercourse, douche or place anything inside the vagina for about 4 weeks after surgery.  Typically, stitches that are within the vagina will dissolve on their own in 4-6 weeks.  You may see white or purple strings, sometimes with a knot, come out when this happens, and should not be concerned.  Slight separation of the edges of the incision are not uncommon and typically heal on their own.  Slight blood spotting may occur when the sutures come out.     EXERCISE AND MOVEMENT  In general, walking is to be encouraged.  You will likely be most comfortable upright or reclining.  Prolonged sitting for the first two weeks after surgery is discouraged.  Walking up and down stairs is fine.     LIFTING RESTRICTIONS - In most cases, you should not lift, push pull any objects weighing more than 8-10 lbs (about the weight of a gallon of milk) for 4 to 6 weeks following your surgery.  Be mindful of how you get up out of bed or a chair, and try to use your arms to assist you so that you do not put a lot of stress on your pelvic floor muscles.  If needed, ask for help getting up.     LOW IMPACT EXERCISES, such as walking, stretching or easy Yoga moves are ok in about one week.  Avoid high impact aerobics, weight lifting, cycling, and high impact aerobics until instructed.  Sit ups or crunches are out, but static core exercises like planks are generally ok after a few weeks.     BLADDER ISSUES  Most patients will be able to empty their bladder (void) satisfactorily prior to leaving the hospital.  In many cases, voiding should be better than before surgery.  If at anytime you do not feel that you are emptying your bladder, you should contact the office  immediately, even after normal business hours.     If you are discharged from the hospital with a serrano catheter, you should anticipate being seen back in the office 1-3 days after discharge to have the catheter removed.  After certain surgical procedures, the serrano catheter may need to remain in place for 1 to 3 weeks to allow the bladder to heal properly.  The serrano bag should be positioned below the level of your vagina to allow it to properly drain.  The catheter should be secured against your leg, without tension to where it enters your body.  If you do not feel that the catheter is draining adequately, and/or feel increasing pain or pressure in the lower part of your abdomen, you should call the office right away.  Our doctors will give you a date for you to come to the office to have the catheter removed (aka voiding trial) when you are discharged.  Oftentimes, this will be done at the [facility] office, even if your surgery was done elsewhere.     BOWEL ISSUES  Try not to strain while having a bowel movement.  It is normal not to have your first bowel movement for up to 4 days after surgery.  You will probably use a stool softener such as docusate (Colace) for 10 -14 days after surgery.  In addition, you are encouraged to use a mild laxative such as milk of magnesia, sennakot (eg sennosides), dulcolax, or Miralax to encourage soft or slightly loose stools for the first week after surgery.  If you are becoming constipated and need additional suggestions about treatment, please call the office during business hours to speak to a nurse.     PAIN CONTROL  Pain in the lower abdomen, pelvis and vagina is typical within the first few weeks.  Typically, pain medications are necessary to improve your comfort in the first week or so.  Tylenol or motrin/advil/ibuprofen can be taken in place of, or in addition to prescribed pain medication.  We suggest that you take extra strength tylenol (500 mg) every six hours  for two more days.  You may then take the tylenol as needed for mild pain  We suggest that in addition to tylenol, you take motrin/advil/ibuprofen 600 mg (400mg if you are over 65) every six hours for 3 - 4 days, then you can take it as needed. Over the counter motrin is 200 mg per tablet, so you will need to increase the dose as appropriate for your age.  Most patients use  Motrin for 1 to 2 weeks after surgery  As discussed, we may have sent a prescription for an opiate pain medication, typically hydromorphone (dilaudid).  This can be taken every 4 -6 hours as needed for pain not relieved by tylenol or motrin.  Most patients take this for between 3 -5 days, and sometimes up to 7-10 days.  If your pain is not relieved by 2 mg of hydromorphone, you can try taking 2 tablets (4 mg)        The pain should gradually subside with time, and become more tolerable without the need for pain medication.  If you pain is not being controlled with the prescribed medication, or you feel it is getting worse and not better contact the office.  Please understand that prescription narcotics cannot be phoned into a pharmacy by law, and will require someone to  a new prescription at one of our offices.     FOLLOW UP  You must be seen for a postoperative visit following your surgery.  Call the office a few days after discharge to schedule this.  Typically you will be seen anywhere between two and six weeks after your surgery.  Most postop visits will not require you to pay a copay, however, a copay should be anticipated following some minor procedures such as hysteroscopy, LEEP, or a D&C.     We hope these instructions will be useful to you.  If there are any additional questions that you feel are not covered here, we suggest that you check our website:   [email or web address] for further information, or contact our office at [phone]

## 2022-11-04 NOTE — PLAN OF CARE
Problem: Adult Inpatient Plan of Care  Goal: Plan of Care Review  Outcome: Progressing  Flowsheets (Taken 11/4/2022 2917)  Progress: no change  Plan of Care Reviewed With: patient  Outcome Summary: Pt AAOx3. Right lower coverderm saturated and leaking through to gown.Spoke with Dr. Faith, pressure dressing applied. Medicated for pain. Up to bedside commode, reported dizziness and nausea. Medicated with zofran. Back to bed with assist. Pt found with O2 off during the night, spo2 check and sats in the 80s. O2 reapplied and titrated to 3L to maintain 92%. IS encouraged, pt achieving 500. Meagan pads monitored with small amount of bleeding x3. Tolerating oral fluids. Call bell in reach.   Plan of Care Review  Plan of Care Reviewed With: patient  Progress: no change  Outcome Summary: Pt AAOx3. Right lower coverderm saturated and leaking through to gown.Spoke with Dr. Faith, pressure dressing applied. Medicated for pain. Up to bedside commode, reported dizziness and nausea. Medicated with zofran. Back to bed with assist. Pt found with O2 off during the night, spo2 check and sats in the 80s. O2 reapplied and titrated to 3L to maintain 92%. IS encouraged, pt achieving 500. Meagan pads monitored with small amount of bleeding x3. Tolerating oral fluids. Call bell in reach.

## 2022-11-04 NOTE — PATIENT CARE CONFERENCE
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

## 2022-11-04 NOTE — PROGRESS NOTES
Retrograde voiding trial done  She voided 140 ml and then another 100 ml over one hour  Serrano placed for 200 ml residual  Will discharge home with serrano over weekend  Will see her Monday for removal

## 2022-11-04 NOTE — NURSING NOTE
Serrano d/c at 9:30 for voiding trial as ordered. Pt ambulated in [place] with assist x1. Pt voided 140cc in 1 hour since serrano d/c.  made aware. Will measure next void.

## 2022-11-04 NOTE — PROGRESS NOTES
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  No active vaginal bleeding. Tolerating diet.  Pain is well controlled.    Objective     Visit Vitals  /61 (BP Location: Right upper arm, Patient Position: Lying)   Pulse 80   Temp 36.6 °C (97.9 °F) (Oral)   Resp 18   Ht 1.575 m (5' 2\")   Wt 63.1 kg (139 lb 1.6 oz)   SpO2 92%   BMI 25.44 kg/m²       Vital signs in last 24 hours:  Temp:  [36.3 °C (97.3 °F)-36.6 °C (97.9 °F)] 36.6 °C (97.9 °F)  Heart Rate:  [53-80] 80  Resp:  [8-18] 18  BP: (106-124)/(55-69) 117/61      Intake/Output Summary (Last 24 hours) at 11/4/2022 0819  Last data filed at 11/4/2022 0700  Gross per 24 hour   Intake 725 ml   Output 1150 ml   Net -425 ml         Physical Exam:  General Appearance:    Alert, cooperative, no distress, appears stated age   Head:    Normocephalic, without obvious abnormality, atraumatic   Neck:   Supple, symmetrical, trachea midline, no adenopathy;     thyroid:  no enlargement/tenderness/nodules; no carotid    bruit or JVD   Lungs:     Clear to auscultation bilaterally, respirations unlabored   Heart:    Regular rate and rhythm, S1 and S2 normal, no murmur, rub or          gallop   Abdomen:     No CVAT, non distended appropriate tenderness, no                      rebound/guarding, active BS, Incision is intact, dry, no redness,         minimal drainage       Genitourinary:   Masters:   No active bleeding, Vaginal pack: Removed    Urine clear, no blood   Extremities:   No palpable cords, SCDs are on   Skin:   Skin color, texture, turgor normal, no rashes or lesions   Behavior/  Emotional:   Appropriate, cooperative     Incision  Clean, Dry and Intact and No Evidence of Infection    Lab Results   Component " Value Date     (L) 11/04/2022    K 4.2 11/04/2022    BUN 19 11/04/2022    EGFR >60.0 11/04/2022    WBC 14.48 (H) 11/04/2022    HGB 10.0 (L) 11/04/2022    HCT 30.8 (L) 11/04/2022     11/04/2022       Imaging  Not applicable    Tube and Drains:   Serrano - urine is clear, without blood    VTE Assessment:   I have reassessed and the patient's VTE risk and treatment plan is appropriate.    Post Op SCIP Measures:  GYN surgery patients are exempt from SCIP serrano measure, SCIP Beta Blocker is N/A and SCIP VTE: SCDS is being continued    Assessment/Plan      POD 1.  Doing well. Will transition to po pain meds. Ambulate and advance diet.   Voiding trial this am.   Possible discharge later today as she is meeting her milestones early, on ERAS protocol.  Will re-evaluate progress at noon  Her WBC is elevated, mostly likely secondary to periop steroids. There is no evidence of surgical site infection. She is at low risk for sepsis  She is hemodynamically stable, BP is normal so we are holding her BP med. Hematocrit is noted, there are no signs of either active or occult bleeding.  This may be dilutional, from IV fluids  Plan voiding trial this am, understands that with a sacral colpopexy and sling, there is a chance she may be discharged with a serrano for a few days    Expected Discharge Date:   11/4/2022      Mitchell Pandey MD

## 2022-11-07 ENCOUNTER — OFFICE VISIT (OUTPATIENT)
Dept: UROGYNECOLOGY | Facility: CLINIC | Age: 77
End: 2022-11-07
Payer: MEDICARE

## 2022-11-07 DIAGNOSIS — R33.8 POSTOPERATIVE URINARY RETENTION: Primary | ICD-10-CM

## 2022-11-07 DIAGNOSIS — N99.89 POSTOPERATIVE URINARY RETENTION: Primary | ICD-10-CM

## 2022-11-07 PROCEDURE — 99024 POSTOP FOLLOW-UP VISIT: CPT | Performed by: OBSTETRICS & GYNECOLOGY

## 2022-11-07 RX ORDER — SULFAMETHOXAZOLE AND TRIMETHOPRIM 800; 160 MG/1; MG/1
1 TABLET ORAL 2 TIMES DAILY
Qty: 6 TABLET | Refills: 0 | Status: CANCELLED | OUTPATIENT
Start: 2022-11-07 | End: 2022-11-10

## 2022-11-07 RX ORDER — NITROFURANTOIN 25; 75 MG/1; MG/1
100 CAPSULE ORAL 2 TIMES DAILY
Qty: 6 CAPSULE | Refills: 0 | Status: SHIPPED | OUTPATIENT
Start: 2022-11-07 | End: 2022-11-10

## 2022-11-07 NOTE — PROGRESS NOTES
Ursula Holland  Visit Date: 11/07/2022     Patient is here for a postoperative visit.  She is  4 day(s) status post  Abdominal Sacral Colpopexy Cystoscopy  and Sling Tvt Transvaginal Tension Free Tape on 11/3/2022. She was discharged home with a serrano catheter. Denies vaginal bleeding.    She passed a voiding trial this am. She voided 300 ml after a 300 ml fill    There were no vitals taken for this visit.    The following have been marked reviewed and updated as appropriate in this visit:   Allergies  Meds  Problems         Review of Systems  Physical Exam  Constitutional:       Appearance: Normal appearance. She is well-developed.   Genitourinary:      Pelvic exam was performed with patient in the lithotomy position.      Urethra, cervix, uterus and urethral meatus normal.      No urethral diverticulum present.   Rectum:      No external hemorrhoid.   Pelvic exam was performed with patient in lithotomy exam position.     External female genitalia normal.   Urethral meatus normal.   Urethra normal. There is no urethral urethrocele or urethral diverticulum. No stress urinary incontinence.  Normal bladder. There is no cystocele or rectocele present.   Cervix exam normal.  Uterus is normal.   Adnexa normal.   Rectal exam shows no rectal prolapse and no external hemorrhoid. Neck:      Thyroid: No thyromegaly.      Vascular: No JVD.   Cardiovascular:      Pulses: Normal pulses.      Comments: No  JVD  No peripheral edema  Peripheral vascular normal  Pulmonary:      Effort: Pulmonary effort is normal. No tachypnea or respiratory distress.   Abdominal:      General: Abdomen is flat. There is no distension.      Palpations: Abdomen is soft. There is no hepatomegaly or mass.      Tenderness: There is no abdominal tenderness. There is no right CVA tenderness, left CVA tenderness, guarding or rebound.      Hernia: No hernia is present.      Comments: Incisions are intact without redness or drainage   Neurological:       Mental Status: She is alert and oriented to person, place, and time.      Sensory: No sensory deficit.   Skin:     Findings: No bruising or rash.   Psychiatric:         Attention and Perception: She is attentive.         Mood and Affect: Affect is not inappropriate.         Behavior: Behavior normal.         Plan:  Diagnoses and all orders for this visit:    Postoperative urinary retention (Primary)  Assessment & Plan:  She was able to pass her voiding trial this morning  I reviewed signs of urinary retention  She will call us this afternoon with an update on her voiding.      Other orders  -     nitrofurantoin, macrocrystal-monohydrate, (MACROBID) 100 mg capsule; Take 1 capsule (100 mg total) by mouth 2 (two) times a day for 3 days.      Return in about 4 weeks (around 12/5/2022).

## 2022-11-07 NOTE — ASSESSMENT & PLAN NOTE
She was able to pass her voiding trial this morning  I reviewed signs of urinary retention  She will call us this afternoon with an update on her voiding.

## 2022-11-07 NOTE — PROGRESS NOTES
Voiding Trial: Filled pt's bladder with 300cc sterile water. Masters removed. Pt voided 310cc in hat without difficulty. Pt given urinary retention instructions and to call office with any problems. Otherwise pt to call office at 2 pm today to let us know how she is doing.

## 2022-11-07 NOTE — PATIENT INSTRUCTIONS
You have passed your voiding trial.  Please monitor your ability to empty your bladder over the next few days.  Please call the office this afternoon to communicate that you have voided normally again after leaving the office.    We have sent an antibiotic to your pharmacy.  We would advise that you take this as instructed for three days in order to prevent a bladder infection.  The signs of a bladder infection include burning while urinating, and an increase in urinary urgency and frequency    Call us immediately, if you have any difficulty voiding, in order to avoid prolonged urinary retention which could damage the bladder.      Signs of this include:    Difficulty starting to urinate  Difficulty fully emptying the bladder  Weak dribble or stream of urine  Loss of small amounts of urine during the day  Inability to feel when bladder is full  Increased abdominal pressure  Lack of urge to urinate  Strained efforts to push urine out of the bladder  Frequent urination  Nocturia (waking up more than two times at night to urinate)

## 2022-12-05 ENCOUNTER — OFFICE VISIT (OUTPATIENT)
Dept: UROGYNECOLOGY | Facility: CLINIC | Age: 77
End: 2022-12-05
Payer: MEDICARE

## 2022-12-05 VITALS
WEIGHT: 138 LBS | HEIGHT: 62 IN | SYSTOLIC BLOOD PRESSURE: 110 MMHG | DIASTOLIC BLOOD PRESSURE: 80 MMHG | BODY MASS INDEX: 25.4 KG/M2

## 2022-12-05 DIAGNOSIS — Z09 POSTOP CHECK: Primary | ICD-10-CM

## 2022-12-05 PROCEDURE — 99024 POSTOP FOLLOW-UP VISIT: CPT | Performed by: OBSTETRICS & GYNECOLOGY

## 2022-12-05 ASSESSMENT — ENCOUNTER SYMPTOMS
CHILLS: 0
BRUISES/BLEEDS EASILY: 0
POLYDIPSIA: 0
WHEEZING: 0
AGITATION: 0
LIGHT-HEADEDNESS: 0
BREAST PAIN: 0
ABDOMINAL PAIN: 0
BREAST MASS: 0
FEVER: 0
NECK STIFFNESS: 0
APPETITE CHANGE: 0
FATIGUE: 0
NAUSEA: 0
SHORTNESS OF BREATH: 0
SEIZURES: 0
JOINT SWELLING: 0
FACIAL ASYMMETRY: 0
DIAPHORESIS: 0
ADENOPATHY: 0
CONSTIPATION: 0
PALPITATIONS: 0
HALLUCINATIONS: 0
DIARRHEA: 0
NECK PAIN: 0
COUGH: 0

## 2022-12-05 NOTE — PROGRESS NOTES
"Ursula Holland  Visit Date: 12/05/2022     Patient is here for a postoperative visit.  She is  4 week(s) status post  Abdominal Sacral Colpopexy Cystoscopy  and Sling Tvt Transvaginal Tension Free Tape on 11/3/2022. Denies vaginal bulge, bleeding. Bladder empties well. Denies urinary leakage. She continues to have abdominal cramping and incisional pain with palpation.     Visit Vitals  /80   Ht 1.575 m (5' 2\")   Wt 62.6 kg (138 lb)   BMI 25.24 kg/m²       The following have been marked reviewed and updated as appropriate in this visit:   Tobacco  Allergies  Meds  Problems  Med Hx  Surg Hx  Fam Hx         Review of Systems   Constitutional: Negative for appetite change, chills, diaphoresis, fatigue and fever.   Respiratory: Negative for cough, shortness of breath and wheezing.    Cardiovascular: Negative for chest pain and palpitations.   Gastrointestinal: Negative for abdominal pain, constipation, diarrhea and nausea.   Endocrine: Negative for polydipsia.   Genitourinary:        See HPI   Breast: Negative for breast discharge, breast mass and breast pain.   Musculoskeletal: Negative for joint swelling, neck pain and neck stiffness.   Skin: Negative for pallor and rash.   Neurological: Negative for seizures, facial asymmetry and light-headedness.   Hematological: Negative for adenopathy. Does not bruise/bleed easily.   Psychiatric/Behavioral: Negative for agitation and hallucinations.     Physical Exam  Constitutional:       General: She is not in acute distress.     Appearance: She is well-developed and well-nourished.   Genitourinary:      Pelvic exam was performed with patient in the lithotomy position.      Urethra, vagina, rectum and urethral meatus normal.      Uterus is absent.      Genitourinary Comments: Well healed and supported. No mesh exposure.  Vaginal incision at apex is intact   POP-Q measurements were:    Aa: -3, Ba: -3, C: -10   gH: pB: TVL: 10     Ap: Bp: D: x  Pelvic exam was " performed with patient in lithotomy exam position.     External female genitalia normal.   Urethral meatus normal.   Urethra normal. No stress urinary incontinence.  Normal bladder.   Vagina normal. There is no vaginal vault prolapse, cystocele or rectocele present.   Uterus is absent. Rectal exam normal. Cardiovascular:      Rate and Rhythm: Normal rate and regular rhythm.      Heart sounds: Normal heart sounds.   Pulmonary:      Effort: Pulmonary effort is normal.      Breath sounds: Normal breath sounds.   Abdominal:      General: Bowel sounds are normal. There is no distension.      Palpations: Abdomen is soft.      Tenderness: There is no abdominal tenderness.      Comments: Well healed suprapubic incisions   Musculoskeletal:         General: Normal range of motion.   Neurological:      Mental Status: She is alert and oriented to person, place, and time.   Skin:     General: Skin is warm, dry and intact.   Psychiatric:         Mood and Affect: Mood and affect normal.         Behavior: Behavior normal.         Thought Content: Thought content normal.         Judgment: Judgment normal.   Vitals reviewed.         Plan:  Diagnoses and all orders for this visit:    Postop check (Primary)  Assessment & Plan:  Has good vaginal support  No bleeding  Suture line at apex is intact        Return in about 4 weeks (around 1/2/2023).

## 2022-12-05 NOTE — PROGRESS NOTES
Pt here today for 4 week s/p surgery on 11/3/22 c/o terrible cramps like period cramps     U/A dip: lexie alaniz

## 2022-12-07 ENCOUNTER — OFFICE VISIT (OUTPATIENT)
Dept: UROGYNECOLOGY | Facility: CLINIC | Age: 77
End: 2022-12-07
Payer: MEDICARE

## 2022-12-07 VITALS
HEIGHT: 62 IN | BODY MASS INDEX: 25.4 KG/M2 | DIASTOLIC BLOOD PRESSURE: 80 MMHG | SYSTOLIC BLOOD PRESSURE: 110 MMHG | WEIGHT: 138 LBS

## 2022-12-07 DIAGNOSIS — B37.2 YEAST INFECTION OF THE SKIN: Primary | ICD-10-CM

## 2022-12-07 PROCEDURE — 99024 POSTOP FOLLOW-UP VISIT: CPT | Performed by: OBSTETRICS & GYNECOLOGY

## 2022-12-07 RX ORDER — NYSTATIN 100000 [USP'U]/G
POWDER TOPICAL 2 TIMES DAILY
Qty: 30 G | Refills: 6 | Status: SHIPPED | OUTPATIENT
Start: 2022-12-07 | End: 2023-01-06

## 2022-12-07 NOTE — ASSESSMENT & PLAN NOTE
I provided reassurances to Ursula that her incision appears intact and it looks most like a yeast infection, rather than a cellulitis or wound infection. I therefore e-prescribed Nystatin powder to her pharmacy. I also recommended she keep the area dry, including letting the incision air dry or use a towel or washcloth over the area to keep it dry.

## 2022-12-07 NOTE — PATIENT INSTRUCTIONS
If you have any problems or concerns, call our office at [phone].  If you think you are having a medical emergency, please call [phone].    If you have access to [facility] (the online patient portal), results from tests ordered at your visit (such as urine tests or ultrasounds) will appear in your portal as soon as they are ready. Please understand that you may see these results faster than we do. Please do not call about the results immediately - we will review your results and contact you after we have had time to analyze your tests.     Please also understand that we frequently do not receive messages sent to us by patients through [facility]. If you have questions or concerns, please call our office.

## 2022-12-07 NOTE — PROGRESS NOTES
C/O Possible infected incision   impaired balance/decreased flexibility/impaired coordination/impaired motor control/decreased strength/impaired postural control/cognition

## 2022-12-07 NOTE — PROGRESS NOTES
Ursula Holland presents today for follow-up on recent surgery.    S: Ms. Holland presents today in urgent follow-up on recent surgery. She underwent abdominal sacrocolpopexy and TVT sling with Dr. Pandey on November 3, 2022. She was seen for a postoperative visit on Monday. However, she reports that starting yesterday (Tuesday), she began to feel some itching and burning on the incision. She looked at her abdominal incision and noted there was an area that appeared red. She therefore called and was asked to come in for an evaluation.    She denies any fevers or pain. She notes now that her only complaint is burning at the left aspect of the incision. She has been struggling to keep the area dry as she has an abdominal fold right above it, but she has been using pads to help keep the area dry.     The following were reviewed today:   Allergies  Meds  Problems         O:   Vitals:    12/07/22 1318   BP: 110/80      Body mass index is 25.24 kg/m².  Gen: This is a well-appearing, well-nourished female in no apparent distress.   Abd: Mild erythema and satellite lesions around the left aspect of the skin, suggestive of candidiasis. No findings suggestive of cellulitis, such as blanching erythema, edema or tenderness to light touch.    Assessment/Plan     Yeast infection of the skin  I provided reassurances to Ursula that her incision appears intact and it looks most like a yeast infection, rather than a cellulitis or wound infection. I therefore e-prescribed Nystatin powder to her pharmacy. I also recommended she keep the area dry, including letting the incision air dry or use a towel or washcloth over the area to keep it dry.      Return if symptoms worsen or fail to improve.       Titi Faith MD PhD

## 2023-01-16 ENCOUNTER — OFFICE VISIT (OUTPATIENT)
Dept: UROGYNECOLOGY | Facility: CLINIC | Age: 78
End: 2023-01-16
Payer: MEDICARE

## 2023-01-16 VITALS
DIASTOLIC BLOOD PRESSURE: 80 MMHG | SYSTOLIC BLOOD PRESSURE: 130 MMHG | WEIGHT: 138 LBS | HEIGHT: 62 IN | BODY MASS INDEX: 25.4 KG/M2

## 2023-01-16 DIAGNOSIS — Z09 POSTOP CHECK: Primary | ICD-10-CM

## 2023-01-16 PROBLEM — R33.8 POSTOPERATIVE URINARY RETENTION: Status: RESOLVED | Noted: 2022-11-07 | Resolved: 2023-01-16

## 2023-01-16 PROBLEM — N99.89 POSTOPERATIVE URINARY RETENTION: Status: RESOLVED | Noted: 2022-11-07 | Resolved: 2023-01-16

## 2023-01-16 PROCEDURE — 99024 POSTOP FOLLOW-UP VISIT: CPT | Performed by: OBSTETRICS & GYNECOLOGY

## 2023-01-16 ASSESSMENT — ENCOUNTER SYMPTOMS
ABDOMINAL PAIN: 0
BRUISES/BLEEDS EASILY: 0
NECK PAIN: 0
HALLUCINATIONS: 0
SHORTNESS OF BREATH: 0
SEIZURES: 0
FATIGUE: 0
PALPITATIONS: 0
DIARRHEA: 0
BREAST MASS: 0
JOINT SWELLING: 0
WHEEZING: 0
CHILLS: 0
NAUSEA: 0
FEVER: 0
APPETITE CHANGE: 0
COUGH: 0
CONSTIPATION: 0
FACIAL ASYMMETRY: 0
AGITATION: 0
BREAST PAIN: 0
ADENOPATHY: 0
LIGHT-HEADEDNESS: 0
POLYDIPSIA: 0
NECK STIFFNESS: 0
DIAPHORESIS: 0

## 2023-01-16 NOTE — PROGRESS NOTES
"Ursula Holland  Visit Date: 01/16/2023     Patient is here for a postoperative visit.  She is  2 month(s) status post  Abdominal Sacral Colpopexy Cystoscopy  and Sling Tvt Transvaginal Tension Free Tape on 11/3/2022. Denies vaginal bulge, bleeding or pain. Bladder empties well. Denies urinary leakage.     Visit Vitals  /80   Ht 1.575 m (5' 2\")   Wt 62.6 kg (138 lb)   BMI 25.24 kg/m²       The following have been marked reviewed and updated as appropriate in this visit:   Tobacco  Allergies  Meds  Problems  Med Hx  Surg Hx  Fam Hx         Review of Systems   Constitutional: Negative for appetite change, chills, diaphoresis, fatigue and fever.   Respiratory: Negative for cough, shortness of breath and wheezing.    Cardiovascular: Negative for chest pain and palpitations.   Gastrointestinal: Negative for abdominal pain, constipation, diarrhea and nausea.   Endocrine: Negative for polydipsia.   Genitourinary:        See HPI   Breast: Negative for breast discharge, breast mass and breast pain.   Musculoskeletal: Negative for joint swelling, neck pain and neck stiffness.   Skin: Negative for pallor and rash.   Neurological: Negative for seizures, facial asymmetry and light-headedness.   Hematological: Negative for adenopathy. Does not bruise/bleed easily.   Psychiatric/Behavioral: Negative for agitation and hallucinations.     Physical Exam  Constitutional:       Appearance: Normal appearance. She is well-developed.   Genitourinary:      Pelvic exam was performed with patient in the lithotomy position.      Urethra, cervix, uterus and urethral meatus normal.      Genitourinary Comments: Good support  No mesh exposure   Rectum:      No external hemorrhoid.   POP-Q measurements were:    Aa: -3, Ba: -3, C: -10     gH: 1, pB: 10, TVL:     Ap: -3, Bp: -3, D:  Pelvic exam was performed with patient in lithotomy exam position.     External female genitalia normal.   Urethral meatus normal.   Urethra normal. No " stress urinary incontinence.  Normal bladder. There is no vaginal vault prolapse, cystocele or rectocele present.   Cervix exam normal.  Uterus is normal.   Adnexa normal.   Rectal exam shows no rectal prolapse and no external hemorrhoid. Neck:      Thyroid: No thyromegaly.      Vascular: No JVD.   Cardiovascular:      Pulses: Normal pulses.      Comments: No  JVD  No peripheral edema  Peripheral vascular normal  Pulmonary:      Effort: Pulmonary effort is normal. No tachypnea or respiratory distress.   Abdominal:      General: Abdomen is flat. There is no distension.      Palpations: There is no hepatomegaly.      Tenderness: There is no abdominal tenderness. There is no right CVA tenderness, left CVA tenderness, guarding or rebound.      Hernia: No hernia is present.      Comments: whss   Musculoskeletal:      Right lower leg: No edema.      Left lower leg: No edema.   Neurological:      Mental Status: She is alert and oriented to person, place, and time.      Sensory: No sensory deficit.   Skin:     Findings: No bruising or rash.   Psychiatric:         Attention and Perception: She is attentive.         Mood and Affect: Affect is not inappropriate.         Behavior: Behavior normal.         Plan:  Diagnoses and all orders for this visit:    Postop check (Primary)  Assessment & Plan:  Doing well  Excellent anatomic support  No mesh complications        Return in about 6 months (around 7/16/2023).

## 2024-08-27 ENCOUNTER — OFFICE VISIT (OUTPATIENT)
Dept: UROGYNECOLOGY | Facility: CLINIC | Age: 79
End: 2024-08-27
Payer: COMMERCIAL

## 2024-08-27 DIAGNOSIS — R15.9 FULL INCONTINENCE OF FECES: Primary | ICD-10-CM

## 2024-08-27 DIAGNOSIS — N30.00 ACUTE CYSTITIS WITHOUT HEMATURIA: ICD-10-CM

## 2024-08-27 PROCEDURE — 99213 OFFICE O/P EST LOW 20 MIN: CPT | Performed by: OBSTETRICS & GYNECOLOGY

## 2024-08-27 RX ORDER — CEFDINIR 300 MG/1
300 CAPSULE ORAL 2 TIMES DAILY
Qty: 10 CAPSULE | Refills: 0 | Status: SHIPPED | OUTPATIENT
Start: 2024-08-27 | End: 2024-09-01

## 2024-08-27 RX ORDER — FLUTICASONE PROPIONATE AND SALMETEROL 250; 50 UG/1; UG/1
1 POWDER RESPIRATORY (INHALATION) 2 TIMES DAILY
COMMUNITY
End: 2024-09-06 | Stop reason: ENTERED-IN-ERROR

## 2024-08-27 ASSESSMENT — ENCOUNTER SYMPTOMS
ADENOPATHY: 0
NERVOUS/ANXIOUS: 0
PALPITATIONS: 0
ANAL BLEEDING: 0
COUGH: 0
FREQUENCY: 0
NUMBNESS: 0
DYSURIA: 0
SHORTNESS OF BREATH: 0
UNEXPECTED WEIGHT CHANGE: 0
DYSPHORIC MOOD: 0
FATIGUE: 0
ROS GI COMMENTS: POSITIVE FOR FECAL INCONTINENCE
LIGHT-HEADEDNESS: 0
ABDOMINAL DISTENTION: 0
BRUISES/BLEEDS EASILY: 0
CONSTIPATION: 0
POLYPHAGIA: 0
DECREASED CONCENTRATION: 0
CONFUSION: 0
DIZZINESS: 0
JOINT SWELLING: 0
CHILLS: 0
WHEEZING: 0
FLANK PAIN: 0
ABDOMINAL PAIN: 0
FEVER: 0
NOCTURNAL DYSPNEA: 0

## 2024-08-27 NOTE — ASSESSMENT & PLAN NOTE
This is getting worse  She has an obvious sphincter defect anteriorly  Will start fiber supplement 1 -3 tablespoons each morning  We discussed sphincteroplasty  There may also be role for sacral neuromodulation, or both

## 2024-08-27 NOTE — PROGRESS NOTES
Had ASC/TVT in 11/2022. C/O Abdominal cramping, fecal incontinence, bowel urgency    UA dip: +Nitrites

## 2024-08-27 NOTE — PROGRESS NOTES
Visit Date: 08/27/2024   Ursula Holland is 79 y.o. female who is here for a re-evaluation of fecal incontinence.     She was last seen in January. She is complaining of more fecal urgency and leakage of solid stool. Her stools are solid to semi solid. She has a known sphincter weakness/anterior disruption. She did have a third degree tear with a pregnancy. She has incontinence to flatus, fecal urgency and daily fecal leakage.     She denies vaginal bulge symptoms, no TYLER. She previously underwent a ASC-TVT in 2022      The following have been reviewed and updated as appropriate in this visit:  Allergies  Meds  Problems  Med Hx  Surg Hx  Fam Hx       There were no vitals taken for this visit.    Review of Systems   Constitutional:  Negative for chills, fatigue, fever and unexpected weight change.   Respiratory:  Negative for cough, shortness of breath and wheezing.    Cardiovascular:  Negative for chest pain, nocturnal dyspnea, palpitations and leg swelling.   Gastrointestinal:  Negative for abdominal distention, abdominal pain, anal bleeding and constipation.         positive for fecal incontinence   Endocrine: Negative for cold intolerance, heat intolerance and polyphagia.   Genitourinary:  Negative for dysuria, enuresis, flank pain, frequency, pelvic pain and vaginal discharge.        Has urge leakage   Musculoskeletal:  Negative for gait problem and joint swelling.   Skin:  Negative for rash.   Allergic/Immunologic: Negative for immunocompromised state.   Neurological:  Negative for dizziness, light-headedness and numbness.   Hematological:  Negative for adenopathy. Does not bruise/bleed easily.   Psychiatric/Behavioral:  Negative for confusion, decreased concentration and dysphoric mood. The patient is not nervous/anxious.      Physical Exam  Constitutional:       Appearance: Normal appearance. She is well-developed.     Genitourinary:    Urethra, bladder, urethral meatus, external female genitalia and  adnexa normal.   Pelvic exam was performed with patient in lithotomy exam position.   Pelvic exam conducted with chaperone present    Rectal exam shows no rectal prolapse and no external hemorrhoid.    Genitourinary Comments: Genitourinary exam was conducted with a chaperone present in the room     Neck:      Thyroid: No thyromegaly.      Vascular: No JVD.   Cardiovascular:      Pulses: Normal pulses.      Comments: No  JVD  No peripheral edema  Peripheral vascular normal  Pulmonary:      Effort: Pulmonary effort is normal. No tachypnea or respiratory distress.   Abdominal:      General: Abdomen is flat. There is no distension.      Palpations: There is no hepatomegaly.      Tenderness: There is no abdominal tenderness. There is no right CVA tenderness, left CVA tenderness, guarding or rebound.      Hernia: No hernia is present.   Neurological:      Mental Status: She is alert and oriented to person, place, and time.      Sensory: No sensory deficit.   Skin:     Findings: No bruising or rash.   Psychiatric:         Attention and Perception: She is attentive.         Mood and Affect: Affect is not inappropriate.         Behavior: Behavior normal.         PLAN:  Diagnoses and all orders for this visit:    Full incontinence of feces (Primary)  Assessment & Plan:  This is getting worse  She has an obvious sphincter defect anteriorly  Will start fiber supplement 1 -3 tablespoons each morning  We discussed sphincteroplasty  There may also be role for sacral neuromodulation, or both     Orders:  -     Case request operating room: ANAL SPHINCTEROPLASTY    Acute cystitis without hematuria  Assessment & Plan:  Treat empirically with cefdinir      Other orders  -     cefdinir (OMNICEF) 300 mg capsule; Take 1 capsule (300 mg total) by mouth 2 (two) times a day for 5 days.              Mitchell Pandey MD

## 2024-08-30 ENCOUNTER — OFFICE VISIT (OUTPATIENT)
Dept: UROGYNECOLOGY | Facility: CLINIC | Age: 79
End: 2024-08-30
Payer: COMMERCIAL

## 2024-08-30 ENCOUNTER — HOSPITAL ENCOUNTER (OUTPATIENT)
Dept: CARDIOLOGY | Facility: HOSPITAL | Age: 79
Discharge: HOME | End: 2024-08-30
Attending: OBSTETRICS & GYNECOLOGY
Payer: COMMERCIAL

## 2024-08-30 ENCOUNTER — APPOINTMENT (OUTPATIENT)
Dept: LAB | Facility: HOSPITAL | Age: 79
End: 2024-08-30
Attending: OBSTETRICS & GYNECOLOGY
Payer: COMMERCIAL

## 2024-08-30 ENCOUNTER — PREP FOR CASE (OUTPATIENT)
Dept: UROGYNECOLOGY | Facility: CLINIC | Age: 79
End: 2024-08-30
Payer: COMMERCIAL

## 2024-08-30 VITALS
BODY MASS INDEX: 25.21 KG/M2 | HEIGHT: 62 IN | WEIGHT: 137 LBS | SYSTOLIC BLOOD PRESSURE: 120 MMHG | DIASTOLIC BLOOD PRESSURE: 78 MMHG

## 2024-08-30 DIAGNOSIS — Z98.890 HISTORY OF SACROCOLPOPEXY: ICD-10-CM

## 2024-08-30 DIAGNOSIS — Z01.818 VISIT FOR PRE-OPERATIVE EXAMINATION: ICD-10-CM

## 2024-08-30 DIAGNOSIS — Z01.818 VISIT FOR PRE-OPERATIVE EXAMINATION: Primary | ICD-10-CM

## 2024-08-30 DIAGNOSIS — R15.9 FULL INCONTINENCE OF FECES: Primary | ICD-10-CM

## 2024-08-30 LAB
ANION GAP SERPL CALC-SCNC: 6 MEQ/L (ref 3–15)
ATRIAL RATE: 63
BUN SERPL-MCNC: 19 MG/DL (ref 7–25)
CALCIUM SERPL-MCNC: 9.2 MG/DL (ref 8.6–10.3)
CHLORIDE SERPL-SCNC: 103 MEQ/L (ref 98–107)
CO2 SERPL-SCNC: 33 MEQ/L (ref 21–31)
CREAT SERPL-MCNC: 1 MG/DL (ref 0.6–1.2)
EGFRCR SERPLBLD CKD-EPI 2021: 57.1 ML/MIN/1.73M*2
ERYTHROCYTE [DISTWIDTH] IN BLOOD BY AUTOMATED COUNT: 12.7 % (ref 11.7–14.4)
GLUCOSE SERPL-MCNC: 94 MG/DL (ref 70–99)
HCT VFR BLD AUTO: 37.6 % (ref 35–45)
HGB BLD-MCNC: 12.3 G/DL (ref 11.8–15.7)
MCH RBC QN AUTO: 28.8 PG (ref 28–33.2)
MCHC RBC AUTO-ENTMCNC: 32.7 G/DL (ref 32.2–35.5)
MCV RBC AUTO: 88.1 FL (ref 83–98)
P AXIS: 32
PDW BLD AUTO: 10.4 FL (ref 9.4–12.3)
PLATELET # BLD AUTO: 199 K/UL (ref 150–369)
POTASSIUM SERPL-SCNC: 3.8 MEQ/L (ref 3.5–5.1)
PR INTERVAL: 148
QRS DURATION: 82
QT INTERVAL: 446
QTC CALCULATION(BAZETT): 456
R AXIS: 42
RBC # BLD AUTO: 4.27 M/UL (ref 3.93–5.22)
SODIUM SERPL-SCNC: 142 MEQ/L (ref 136–145)
T WAVE AXIS: 76
VENTRICULAR RATE: 63
WBC # BLD AUTO: 5.89 K/UL (ref 3.8–10.5)

## 2024-08-30 PROCEDURE — 93005 ELECTROCARDIOGRAM TRACING: CPT

## 2024-08-30 PROCEDURE — 3078F DIAST BP <80 MM HG: CPT | Performed by: OBSTETRICS & GYNECOLOGY

## 2024-08-30 PROCEDURE — 99214 OFFICE O/P EST MOD 30 MIN: CPT | Performed by: OBSTETRICS & GYNECOLOGY

## 2024-08-30 PROCEDURE — 3074F SYST BP LT 130 MM HG: CPT | Performed by: OBSTETRICS & GYNECOLOGY

## 2024-08-30 PROCEDURE — 36415 COLL VENOUS BLD VENIPUNCTURE: CPT

## 2024-08-30 PROCEDURE — 80048 BASIC METABOLIC PNL TOTAL CA: CPT

## 2024-08-30 PROCEDURE — 85027 COMPLETE CBC AUTOMATED: CPT

## 2024-08-30 PROCEDURE — 3008F BODY MASS INDEX DOCD: CPT | Performed by: OBSTETRICS & GYNECOLOGY

## 2024-08-30 RX ORDER — ACETAMINOPHEN 325 MG/1
650 TABLET ORAL ONCE
Status: CANCELLED | OUTPATIENT
Start: 2024-08-30 | End: 2024-08-30

## 2024-08-30 RX ORDER — SODIUM CHLORIDE 9 MG/ML
INJECTION, SOLUTION INTRAVENOUS CONTINUOUS
Status: CANCELLED | OUTPATIENT
Start: 2024-08-30 | End: 2024-08-31

## 2024-08-30 ASSESSMENT — ENCOUNTER SYMPTOMS
BRUISES/BLEEDS EASILY: 0
FATIGUE: 0
DECREASED CONCENTRATION: 0
ANAL BLEEDING: 0
LIGHT-HEADEDNESS: 0
CHILLS: 0
DIZZINESS: 0
UNEXPECTED WEIGHT CHANGE: 0
PALPITATIONS: 0
CONFUSION: 0
JOINT SWELLING: 0
WHEEZING: 0
COUGH: 0
ABDOMINAL DISTENTION: 0
SHORTNESS OF BREATH: 0
NOCTURNAL DYSPNEA: 0
ABDOMINAL PAIN: 0

## 2024-08-30 NOTE — H&P
Urogynecology Preoperative H&P    HPI:  Ursula Holland is a 79 y.o. female who was admitted to undergo surgical correction of anal sphincteroplasty for fecal incontinence with an anterior sphincter disruption    Medical History:   Past Medical History:   Diagnosis Date    Breast cancer (CMS/HCC)     right lumpectomy, radiation    Bronchiectasis (CMS/HCC)     Diverticulitis of colon     GERD (gastroesophageal reflux disease)     Hypertension     Lipid disorder     Lupus (CMS/HCC)     Stress incontinence        Surgical History:   Past Surgical History:   Procedure Laterality Date    BREAST LUMPECTOMY Right 2015    CHOLECYSTECTOMY  2001    EYE SURGERY Right     laser for retinal hole    HYSTERECTOMY  1980       Obstetric History:     Social History:   Social History     Tobacco Use    Smoking status: Former     Types: Cigarettes     Quit date:      Years since quittin.6    Smokeless tobacco: Never   Vaping Use    Vaping Use: Never used   Substance Use Topics    Alcohol use: Yes     Comment: socially/occasionally    Drug use: Never       Family History: No family history on file.    Allergies: Shellfish derived, Iodinated contrast media, Adhesive tape-silicones, Oxycodone, and Red dye    Meds:   Current Outpatient Medications:     anastrozole (ARIMIDEX) 1 mg tablet, Take  1 mg in the morning, Disp: , Rfl:     azithromycin (ZITHROMAX) 250 mg tablet, Take 250 mg by mouth 3 (three) times a week (Mon, Wed, Fri)., Disp: , Rfl:     cefdinir (OMNICEF) 300 mg capsule, Take 1 capsule (300 mg total) by mouth 2 (two) times a day for 5 days., Disp: 10 capsule, Rfl: 0    diphenhydrAMINE-acetaminophen (TYLENOL PM)  mg tablet, Take 1 tablet by mouth nightly as needed for sleep., Disp: , Rfl:     esomeprazole (NexIUM) 40 mg capsule, Take 40 mg by mouth in the morning., Disp: , Rfl:     fexofenadine (ALLEGRA) 180 mg tablet, Take 180 mg by mouth in the morning., Disp: , Rfl:     fluticasone propion-salmeteroL  (ADVAIR DISKUS) 250-50 mcg/dose diskus inhaler, Inhale 1 puff every evening., Disp: , Rfl:     fluticasone propion-salmeteroL (ADVAIR DISKUS) 250-50 mcg/dose diskus inhaler, Inhale 1 puff 2 (two) times a day., Disp: , Rfl:     guaiFENesin (MUCINEX) 1,200 mg tablet extended release 12hr, Take 1 tablet by mouth in the morning., Disp: , Rfl:     montelukast (SINGULAIR) 10 mg tablet, Take 10 mg by mouth in the morning., Disp: , Rfl:     olmesartan-hydrochlorothiazide (BENICAR HCT) 20-12.5 mg per tablet, Take 1 tablet by mouth in the morning., Disp: , Rfl:     simvastatin (ZOCOR) 20 mg tablet, Take 20 mg by mouth in the morning., Disp: , Rfl:     valACYclovir (VALTREX) 1 gram tablet, Take 1,000 mg by mouth as needed., Disp: , Rfl:     Review of Systems  All other systems reviewed and negative except as noted in the HPI.    Physicial Exam  There were no vitals taken for this visit.    General Appearance:  Alert, cooperative, no distress, appears stated age   Head:  Normocephalic, without obvious abnormality, atraumatic   Eyes:  Deferred   Ears:  Deferred   Nose: Deferred   Throat: Lips, mucosa, and tongue normal   Neck: Supple, symmetrical, trachea midline, no adenopathy;   thyroid:  no enlargement/tenderness/nodules   Back:   Symmetric, no curvature, ROM normal, no CVA tenderness   Lungs:   Clear to auscultation bilaterally, respirations unlabored   Chest Wall:  No tenderness or deformity   Heart:: Regular rate and rhythm, S1 and S2 normal, no murmur, rub or gallop   Abdomen:   Soft, non-tender, non distended, bowel sounds active all four quadrants, no rebound or guarding  no masses, no organomegaly, no flank tenderness   Genitalia:  External genitalia appears normal, Urethra and meatus are normal, Bladder normal,     Rectal:  Deferred   Extremities: Extremities normal, atraumatic, no cyanosis or edema   Musculoskeletal:  Pulses: No injury or deformity  2+ and symmetric all extremities   Skin: Skin color, texture, turgor  normal, no rashes or lesions   Lymph nodes: Cervical, supraclavicular, and axillary nodes normal   Neurologic:  Behavior/  Emotional: Sensation is intact to light touch and pinprick along sacral dermatomes  Appropriate, cooperative     Labs are pending.    ASSESSMENT/PLAN    Plan transperineal sphincteroplasty    The patient has been advised as to the risks, benefits, and alternatives of the intended procedures and we have also discussed the anticipated postoperative course and recovery.  She understands that she could be discharged with a serrano catheter.  Standard [facility] VTE and antibiotic protocols will be followed.    Mitchell Pandey MD

## 2024-08-30 NOTE — PROGRESS NOTES
Visit Date: 08/30/2024     Ursula Holland is 79 y.o. female presenting for an informed consent visit for surgery. We are planning on performing  anal sphincteroplasty. She previously had a sacral colpopexy .in 2022. She is doing well from that standpoint. Her current complaints are fecal incontinence that occur several times a week, with urgency. She previously had an obstetric sphincter injury, on exam has an obvious anterior sphincter defect    A preop risk assessment was performed.  This included a review of her allergies and current medications.  Medical clearance, when applicable was also reviewed, as well as the status of preop labs.  VTE risk was assessed.  Prior anethesia history was elicited.  Preop labs were ordered, and reviewed by me, if resulted.    It is anticipated that narcotic medication will be prescribed to the patient following her surgery both as an inpatient and outpatient. In compliance with Act 191 of 2014, the [place] PDMP was queried, during this patient encounter, and no red flags were identified, and the presribing of narcotics was judged to be safe and appropriate, and this was discussed with the patient.  The internet based query added 10 additional minutes to the face to face encounter.    PREOPERATIVE RISK ASSESSMENT    Caprini Score      Patient has pessary in place that should be removed before procedure: no     Prior Blood Transfusion: No  Prior Problems with Anesthesia:No  Significant postop nausea: No  Prior hysterectomy: yes   Known Drug Allergies:   Allergies as of 08/30/2024 - Reviewed 08/30/2024   Allergen Reaction Noted    Shellfish derived Hives 02/01/2020    Iodinated contrast media  02/01/2020    Adhesive tape-silicones Rash 02/01/2020    Oxycodone Itching 02/01/2020    Red dye Itching and Rash 10/10/2022     Daily use of aspirin or NSAIDS: No  Known Cardiovascular disease:No has HTN and high cholesterol  Known Diabetes:no   Known Thyroid Disease:no   Known Renal Disease:no  "  Prior CVA:No  Prior CHF or CAD:no   Prior Bleed problems:No  Family History of bleeding disorder: No  Smoker:  Social History     Tobacco Use   Smoking Status Former    Types: Cigarettes    Quit date:     Years since quittin.6   Smokeless Tobacco Never     Current or Past Narcotic use:No  Medical/Cardiac clearances: N/A    Vital Signs for this encounter: Visit Vitals  /78   Ht 1.575 m (5' 2\")   Wt 62.1 kg (137 lb)   BMI 25.06 kg/m²       The following have been marked reviewed and updated as appropriate in this visit:          Review of Systems   Constitutional:  Negative for chills, fatigue and unexpected weight change.   Respiratory:  Negative for cough, shortness of breath and wheezing.    Cardiovascular:  Negative for chest pain, nocturnal dyspnea, palpitations and leg swelling.   Gastrointestinal:  Negative for abdominal distention, abdominal pain and anal bleeding.   Endocrine: Negative for cold intolerance and heat intolerance.   Musculoskeletal:  Negative for gait problem and joint swelling.   Skin:  Negative for rash.   Allergic/Immunologic: Negative for immunocompromised state.   Neurological:  Negative for dizziness and light-headedness.   Hematological:  Does not bruise/bleed easily.   Psychiatric/Behavioral:  Negative for confusion and decreased concentration.      Physical Exam  Constitutional:       Appearance: She is well-developed.     Genitourinary: Pelvic exam deferred.    Neck:      Thyroid: No thyromegaly.      Vascular: No JVD.   Cardiovascular:      Comments: No peripheral edema  Pulmonary:      Effort: Pulmonary effort is normal.      Breath sounds: Normal breath sounds.   Abdominal:      General: There is no distension.      Palpations: Abdomen is soft. There is no mass.   Neurological:      Mental Status: She is alert and oriented to person, place, and time.   Skin:     General: Skin is warm and dry.      Findings: No rash.   Psychiatric:         Behavior: Behavior normal.  "        Judgment: Judgment normal.       Assessment/Plan   Plan:   Diagnoses and all orders for this visit:    Full incontinence of feces (Primary)  Assessment & Plan:  We are planning a sphincteroplasty as a next step  I explained that this will correct the anatomic defect, that is present  However, there could still be some nerve injury that could contribute to her symptoms  Sacral neuromodulation is an additional modality that could be helpful in the future      History of sacrocolpopexy      The patient presented to the office today for counseling regarding her decision for gynecologic/ reconstructive pelvic surgery.  All pertinent past evaluations and studies were reviewed.  The patient was counseled regarding alternative non-surgical options, as well as the prognosis without therapy or surgery.    Surgical options were reviewed again with the patient, including traditional open abdominal approaches,  vaginal approaches (where appropriate) as well as laparoscopic approaches.  When appropriate, non-hysterectomy alternatives were presented and discussed.  The pros and cons of newer surgical procedures were reviewed.  The pros and cons regarding the use of synthetic mesh in the treatment of stress urinary incontinence and prolapse were discussed.  Our extensive experience with these materials were reviewed, and when appropriate, the 2011 FDA public health notification was reviewed and discussed.    The general risks of surgery were reviewed including, infection, bleeding, transfusion, injury to surrounding organs and tissues, such as the bladder, bowel, rectum, urethra, ureters and the nerves and blood vessels in the pelvis.  Specific post-operative risks including, but not limited to:  as venous thromboembolism, surgical site infection, surgical failure, voiding dysfunction, urinary retention that might result in prolonged need for a srerano catheter after discharge, painful intercourse and surgical failure.  The  approximate length of surgery and hospital stay were reviewed. The duration of the post-op recovery period and general overview of convalescence and post-operative follow up were also reviewed.  The patient verbalized an understanding of the proposed procedure. Consents were signed and questions answered.    In preparation for the surgery, I reviewed the patients records prior to the visit, communicated with primary care provider when appropriate regarding medical clearance and coordination of care, reviewed and order preop labs, completed documentation within the EHR (H&P), communicated with family members to coordinate care and counseled the patient on pre and postop instructions, explained the role of our enhanced recovery protocol. All of this was completed on the date of the encounter and required a minimum of 35 minutes.

## 2024-08-30 NOTE — ASSESSMENT & PLAN NOTE
We are planning a sphincteroplasty as a next step  I explained that this will correct the anatomic defect, that is present  However, there could still be some nerve injury that could contribute to her symptoms  Sacral neuromodulation is an additional modality that could be helpful in the future

## 2024-09-04 ENCOUNTER — ANESTHESIA EVENT (OUTPATIENT)
Dept: OPERATING ROOM | Facility: HOSPITAL | Age: 79
End: 2024-09-04
Payer: COMMERCIAL

## 2024-09-06 ENCOUNTER — PRE-ADMISSION TESTING (OUTPATIENT)
Dept: PREADMISSION TESTING | Age: 79
End: 2024-09-06
Payer: COMMERCIAL

## 2024-09-06 VITALS — BODY MASS INDEX: 25.4 KG/M2 | WEIGHT: 138 LBS | HEIGHT: 62 IN

## 2024-09-06 ASSESSMENT — PAIN SCALES - GENERAL: PAINLEVEL: 0-NO PAIN

## 2024-09-06 NOTE — PRE-PROCEDURE INSTRUCTIONS
[address] [address]    1.       We will call you between 3 pm and 7 pm on September 9, 2024 to inform you of arrival time for your procedure. If you do not hear by 6PM, please call [phone] for arrival time.     2.        Please arrive through the Main Entrance of the [facility] (across from the parking garage) and report to the Surgery Registration Desk on the day of your procedure.    3. Please follow the following fasting guidelines:     No solid food EIGHT HOURS prior to arrival time on day of surgery.  6 ounces of clear liquids, meaning water or PLAIN black coffee WITHOUT any milk, cream, sugar, or sweetener are permitted up to TWO HOURS prior to arrival at the hospital.    4. Please take ONLY the following medications with a sip of water on the morning of your procedure: esomeprazole, montelukast,allegra.    5. Other Instructions: You may brush your teeth the morning of the procedure. Rinse and spit, do not swallow.  Bring a list of your medications with dosages.  Use surgical wash as directed. Bring Wixela with you .    6. If you develop a cold, cough, fever, rash, or other symptom prior to the day of the procedure, please report it to your physician immediately.    7. If you need to cancel the procedure for any reason, please contact your physician.    8. Make arrangements to have safe transportation home accompanied by a responsible adult. If you have not arranged safe transportation home, your surgery will be cancelled. Safe transportation may include private vehicle, ride-share service, taxi and public transportation when accompanied by a responsible adult who will assist you home. A responsible adult is someone known to you and does not include the taxi, ride-share or public transit drive transporting you.    9.  If it is medically necessary for you to have a longer stay, you will be informed as soon as the decision is made.    10. Only bring essential items to the  hospital.  Do not wear or bring anything of value to the hospital including jewelry of any kind, money, or wallet. Do not wear make-up or contact lenses.  DO NOT BRING MEDICATIONS FROM HOME unless instructed to do so. DO bring your hearing aids, glasses, and a case.    11. No lotion, creams, powders, or oils on skin the morning of procedure     12. Dress in comfortable clothes.    13.  If instructed, please bring a copy of your Advanced Directive (Living Will/Durable Power of ) on the day of your procedure.     14. Ensuring your safety at all times is a very important part of our [facility] Culture of Safety. After having surgery and sedation, you are at risk for falling and balance issues. Although you may feel awake, the effects of the medication can last up to 24 hours after anesthesia. If you need to use the bathroom during your recovery period, nursing staff will escort you there and stay with you to ensure your safety.    15. Refrain from drinking alcohol and smoking cigarettes for 24 hours prior to surgery.    16. Shower with antibacterial soap (Dial) the night before and morning of your procedure.  If your procedure indicates the need for CHG antiseptic wash (Bactoshield or Hibiclens), please use this instead and follow instructions as discussed at the time of your Pre-Admission Testing phone interview or visit.    Above instructions reviewed with patient and patient acknowledges understanding.    Form explained by: Idris Dawson RN

## 2024-09-10 ENCOUNTER — HOSPITAL ENCOUNTER (OUTPATIENT)
Facility: HOSPITAL | Age: 79
Discharge: HOME | End: 2024-09-11
Attending: OBSTETRICS & GYNECOLOGY | Admitting: OBSTETRICS & GYNECOLOGY
Payer: COMMERCIAL

## 2024-09-10 ENCOUNTER — ANESTHESIA (OUTPATIENT)
Dept: OPERATING ROOM | Facility: HOSPITAL | Age: 79
End: 2024-09-10
Payer: COMMERCIAL

## 2024-09-10 PROCEDURE — 71000011 HC PACU PHASE 1 EA ADDL MIN: Performed by: OBSTETRICS & GYNECOLOGY

## 2024-09-10 PROCEDURE — 25000000 HC PHARMACY GENERAL: Performed by: OBSTETRICS & GYNECOLOGY

## 2024-09-10 PROCEDURE — 46750 REPAIR OF ANAL SPHINCTER: CPT | Performed by: OBSTETRICS & GYNECOLOGY

## 2024-09-10 PROCEDURE — S5000 PRESCRIPTION DRUG, GENERIC: HCPCS | Performed by: OBSTETRICS & GYNECOLOGY

## 2024-09-10 PROCEDURE — 63600000 HC DRUGS/DETAIL CODE: Mod: JW

## 2024-09-10 PROCEDURE — S5000 PRESCRIPTION DRUG, GENERIC: HCPCS

## 2024-09-10 PROCEDURE — 25800000 HC PHARMACY IV SOLUTIONS: Performed by: OBSTETRICS & GYNECOLOGY

## 2024-09-10 PROCEDURE — 63700000 HC SELF-ADMINISTRABLE DRUG: Performed by: OBSTETRICS & GYNECOLOGY

## 2024-09-10 PROCEDURE — 37000001 HC ANESTHESIA GENERAL: Performed by: OBSTETRICS & GYNECOLOGY

## 2024-09-10 PROCEDURE — 71000001 HC PACU PHASE 1 INITIAL 30MIN: Performed by: OBSTETRICS & GYNECOLOGY

## 2024-09-10 PROCEDURE — 36000012 HC OR LEVEL 2 EA ADDL MIN: Performed by: OBSTETRICS & GYNECOLOGY

## 2024-09-10 PROCEDURE — 36000002 HC OR LEVEL 2 INITIAL 30MIN: Performed by: OBSTETRICS & GYNECOLOGY

## 2024-09-10 PROCEDURE — 27200000 HC STERILE SUPPLY: Performed by: OBSTETRICS & GYNECOLOGY

## 2024-09-10 PROCEDURE — 0DQR0ZZ REPAIR ANAL SPHINCTER, OPEN APPROACH: ICD-10-PCS | Performed by: OBSTETRICS & GYNECOLOGY

## 2024-09-10 PROCEDURE — 25000000 HC PHARMACY GENERAL

## 2024-09-10 PROCEDURE — 63600000 HC DRUGS/DETAIL CODE: Mod: JZ | Performed by: OBSTETRICS & GYNECOLOGY

## 2024-09-10 RX ORDER — ONDANSETRON HYDROCHLORIDE 2 MG/ML
4 INJECTION, SOLUTION INTRAVENOUS EVERY 8 HOURS PRN
Status: DISCONTINUED | OUTPATIENT
Start: 2024-09-10 | End: 2024-09-11

## 2024-09-10 RX ORDER — SODIUM CHLORIDE 9 MG/ML
INJECTION, SOLUTION INTRAVENOUS CONTINUOUS
Status: DISCONTINUED | OUTPATIENT
Start: 2024-09-10 | End: 2024-09-10

## 2024-09-10 RX ORDER — KETOROLAC TROMETHAMINE 15 MG/ML
15 INJECTION, SOLUTION INTRAMUSCULAR; INTRAVENOUS
Status: DISCONTINUED | OUTPATIENT
Start: 2024-09-10 | End: 2024-09-10

## 2024-09-10 RX ORDER — LIDOCAINE HYDROCHLORIDE 10 MG/ML
INJECTION, SOLUTION EPIDURAL; INFILTRATION; INTRACAUDAL; PERINEURAL AS NEEDED
Status: DISCONTINUED | OUTPATIENT
Start: 2024-09-10 | End: 2024-09-10 | Stop reason: SURG

## 2024-09-10 RX ORDER — DIPHENHYDRAMINE HCL 50 MG/ML
12.5 VIAL (ML) INJECTION
Status: DISCONTINUED | OUTPATIENT
Start: 2024-09-10 | End: 2024-09-10 | Stop reason: HOSPADM

## 2024-09-10 RX ORDER — KETOROLAC TROMETHAMINE 15 MG/ML
INJECTION, SOLUTION INTRAMUSCULAR; INTRAVENOUS AS NEEDED
Status: DISCONTINUED | OUTPATIENT
Start: 2024-09-10 | End: 2024-09-10 | Stop reason: SURG

## 2024-09-10 RX ORDER — HYDROMORPHONE HYDROCHLORIDE 1 MG/ML
0.25 INJECTION, SOLUTION INTRAMUSCULAR; INTRAVENOUS; SUBCUTANEOUS EVERY 4 HOURS PRN
Status: DISCONTINUED | OUTPATIENT
Start: 2024-09-10 | End: 2024-09-11

## 2024-09-10 RX ORDER — PANTOPRAZOLE SODIUM 20 MG/1
20 TABLET, DELAYED RELEASE ORAL DAILY
Status: DISCONTINUED | OUTPATIENT
Start: 2024-09-11 | End: 2024-09-11 | Stop reason: HOSPADM

## 2024-09-10 RX ORDER — LOSARTAN POTASSIUM 50 MG/1
50 TABLET ORAL DAILY
Status: DISCONTINUED | OUTPATIENT
Start: 2024-09-10 | End: 2024-09-11 | Stop reason: HOSPADM

## 2024-09-10 RX ORDER — PROPOFOL 10 MG/ML
INJECTION, EMULSION INTRAVENOUS AS NEEDED
Status: DISCONTINUED | OUTPATIENT
Start: 2024-09-10 | End: 2024-09-10 | Stop reason: SURG

## 2024-09-10 RX ORDER — SODIUM CHLORIDE, SODIUM LACTATE, POTASSIUM CHLORIDE, CALCIUM CHLORIDE 600; 310; 30; 20 MG/100ML; MG/100ML; MG/100ML; MG/100ML
60 INJECTION, SOLUTION INTRAVENOUS CONTINUOUS
Status: DISCONTINUED | OUTPATIENT
Start: 2024-09-10 | End: 2024-09-11

## 2024-09-10 RX ORDER — IBUPROFEN 200 MG
16-32 TABLET ORAL AS NEEDED
Status: DISCONTINUED | OUTPATIENT
Start: 2024-09-10 | End: 2024-09-11 | Stop reason: HOSPADM

## 2024-09-10 RX ORDER — ONDANSETRON HYDROCHLORIDE 2 MG/ML
INJECTION, SOLUTION INTRAVENOUS AS NEEDED
Status: DISCONTINUED | OUTPATIENT
Start: 2024-09-10 | End: 2024-09-10 | Stop reason: SURG

## 2024-09-10 RX ORDER — MEPERIDINE HYDROCHLORIDE 25 MG/ML
12.5 INJECTION INTRAMUSCULAR; INTRAVENOUS; SUBCUTANEOUS EVERY 10 MIN PRN
Status: DISCONTINUED | OUTPATIENT
Start: 2024-09-10 | End: 2024-09-10 | Stop reason: HOSPADM

## 2024-09-10 RX ORDER — ONDANSETRON HYDROCHLORIDE 2 MG/ML
4 INJECTION, SOLUTION INTRAVENOUS
Status: DISCONTINUED | OUTPATIENT
Start: 2024-09-10 | End: 2024-09-10 | Stop reason: HOSPADM

## 2024-09-10 RX ORDER — ACETAMINOPHEN 325 MG/1
650 TABLET ORAL ONCE
Status: COMPLETED | OUTPATIENT
Start: 2024-09-10 | End: 2024-09-10

## 2024-09-10 RX ORDER — DEXTROSE 50 % IN WATER (D50W) INTRAVENOUS SYRINGE
25 AS NEEDED
Status: DISCONTINUED | OUTPATIENT
Start: 2024-09-10 | End: 2024-09-11 | Stop reason: HOSPADM

## 2024-09-10 RX ORDER — EPHEDRINE SULFATE/0.9% NACL/PF 50 MG/5 ML
SYRINGE (ML) INTRAVENOUS AS NEEDED
Status: DISCONTINUED | OUTPATIENT
Start: 2024-09-10 | End: 2024-09-10 | Stop reason: SURG

## 2024-09-10 RX ORDER — HYDROCHLOROTHIAZIDE 12.5 MG/1
12.5 TABLET ORAL DAILY
Status: DISCONTINUED | OUTPATIENT
Start: 2024-09-10 | End: 2024-09-11 | Stop reason: HOSPADM

## 2024-09-10 RX ORDER — LIDOCAINE HYDROCHLORIDE AND EPINEPHRINE 10; 10 UG/ML; MG/ML
INJECTION, SOLUTION INFILTRATION; PERINEURAL
Status: DISCONTINUED | OUTPATIENT
Start: 2024-09-10 | End: 2024-09-10 | Stop reason: HOSPADM

## 2024-09-10 RX ORDER — ANASTROZOLE 1 MG/1
1 TABLET ORAL DAILY
Status: DISCONTINUED | OUTPATIENT
Start: 2024-09-10 | End: 2024-09-11 | Stop reason: HOSPADM

## 2024-09-10 RX ORDER — DOCUSATE SODIUM 100 MG/1
100 CAPSULE, LIQUID FILLED ORAL 2 TIMES DAILY
Status: DISCONTINUED | OUTPATIENT
Start: 2024-09-10 | End: 2024-09-11 | Stop reason: HOSPADM

## 2024-09-10 RX ORDER — FENTANYL CITRATE 50 UG/ML
INJECTION, SOLUTION INTRAMUSCULAR; INTRAVENOUS AS NEEDED
Status: DISCONTINUED | OUTPATIENT
Start: 2024-09-10 | End: 2024-09-10 | Stop reason: SURG

## 2024-09-10 RX ORDER — ADHESIVE BANDAGE
30 BANDAGE TOPICAL 2 TIMES DAILY
Status: DISCONTINUED | OUTPATIENT
Start: 2024-09-10 | End: 2024-09-11 | Stop reason: HOSPADM

## 2024-09-10 RX ORDER — MONTELUKAST SODIUM 10 MG/1
10 TABLET ORAL DAILY
Status: DISCONTINUED | OUTPATIENT
Start: 2024-09-11 | End: 2024-09-11 | Stop reason: HOSPADM

## 2024-09-10 RX ORDER — FENTANYL CITRATE 50 UG/ML
50 INJECTION, SOLUTION INTRAMUSCULAR; INTRAVENOUS EVERY 5 MIN PRN
Status: DISCONTINUED | OUTPATIENT
Start: 2024-09-10 | End: 2024-09-10 | Stop reason: HOSPADM

## 2024-09-10 RX ORDER — IBUPROFEN 400 MG/1
400 TABLET ORAL
Status: DISCONTINUED | OUTPATIENT
Start: 2024-09-10 | End: 2024-09-11 | Stop reason: HOSPADM

## 2024-09-10 RX ORDER — DEXAMETHASONE SODIUM PHOSPHATE 4 MG/ML
INJECTION, SOLUTION INTRA-ARTICULAR; INTRALESIONAL; INTRAMUSCULAR; INTRAVENOUS; SOFT TISSUE AS NEEDED
Status: DISCONTINUED | OUTPATIENT
Start: 2024-09-10 | End: 2024-09-10 | Stop reason: SURG

## 2024-09-10 RX ORDER — DEXTROSE 40 %
15-30 GEL (GRAM) ORAL AS NEEDED
Status: DISCONTINUED | OUTPATIENT
Start: 2024-09-10 | End: 2024-09-11 | Stop reason: HOSPADM

## 2024-09-10 RX ORDER — ONDANSETRON 4 MG/1
4 TABLET, ORALLY DISINTEGRATING ORAL EVERY 8 HOURS PRN
Status: DISCONTINUED | OUTPATIENT
Start: 2024-09-10 | End: 2024-09-11 | Stop reason: HOSPADM

## 2024-09-10 RX ORDER — HYDROMORPHONE HYDROCHLORIDE 4 MG/1
2 TABLET ORAL EVERY 4 HOURS PRN
Status: DISCONTINUED | OUTPATIENT
Start: 2024-09-10 | End: 2024-09-11 | Stop reason: HOSPADM

## 2024-09-10 RX ADMIN — PROPOFOL 25 MG: 10 INJECTION, EMULSION INTRAVENOUS at 09:48

## 2024-09-10 RX ADMIN — CEFOTETAN DISODIUM 1 G: 1 INJECTION, POWDER, FOR SOLUTION INTRAMUSCULAR; INTRAVENOUS at 21:19

## 2024-09-10 RX ADMIN — HYDROCHLOROTHIAZIDE 12.5 MG: 12.5 TABLET ORAL at 15:31

## 2024-09-10 RX ADMIN — SODIUM CHLORIDE 40 ML/HR: 9 INJECTION, SOLUTION INTRAVENOUS at 08:42

## 2024-09-10 RX ADMIN — DOCUSATE SODIUM 100 MG: 100 CAPSULE, LIQUID FILLED ORAL at 21:18

## 2024-09-10 RX ADMIN — FENTANYL CITRATE 50 MCG: 50 INJECTION, SOLUTION INTRAMUSCULAR; INTRAVENOUS at 09:55

## 2024-09-10 RX ADMIN — IBUPROFEN 400 MG: 400 TABLET, FILM COATED ORAL at 16:16

## 2024-09-10 RX ADMIN — SODIUM CHLORIDE, POTASSIUM CHLORIDE, SODIUM LACTATE AND CALCIUM CHLORIDE 60 ML/HR: 600; 310; 30; 20 INJECTION, SOLUTION INTRAVENOUS at 15:29

## 2024-09-10 RX ADMIN — MAGNESIUM HYDROXIDE 30 ML: 400 SUSPENSION ORAL at 21:18

## 2024-09-10 RX ADMIN — KETOROLAC TROMETHAMINE 15 MG: 15 INJECTION, SOLUTION INTRAMUSCULAR; INTRAVENOUS at 10:39

## 2024-09-10 RX ADMIN — LIDOCAINE HYDROCHLORIDE 5 ML: 10 INJECTION, SOLUTION EPIDURAL; INFILTRATION; INTRACAUDAL; PERINEURAL at 09:48

## 2024-09-10 RX ADMIN — CEFOTETAN DISODIUM 1 G: 1 INJECTION, POWDER, FOR SOLUTION INTRAMUSCULAR; INTRAVENOUS at 09:20

## 2024-09-10 RX ADMIN — ONDANSETRON 4 MG: 2 INJECTION INTRAMUSCULAR; INTRAVENOUS at 10:41

## 2024-09-10 RX ADMIN — FENTANYL CITRATE 50 MCG: 50 INJECTION, SOLUTION INTRAMUSCULAR; INTRAVENOUS at 10:42

## 2024-09-10 RX ADMIN — ANASTROZOLE 1 MG: 1 TABLET, COATED ORAL at 18:07

## 2024-09-10 RX ADMIN — HYDROMORPHONE HYDROCHLORIDE 2 MG: 4 TABLET ORAL at 21:34

## 2024-09-10 RX ADMIN — Medication 5 MG: at 10:02

## 2024-09-10 RX ADMIN — IBUPROFEN 400 MG: 400 TABLET, FILM COATED ORAL at 21:18

## 2024-09-10 RX ADMIN — DEXAMETHASONE SODIUM PHOSPHATE 4 MG: 4 INJECTION, SOLUTION INTRA-ARTICULAR; INTRALESIONAL; INTRAMUSCULAR; INTRAVENOUS; SOFT TISSUE at 09:51

## 2024-09-10 RX ADMIN — ACETAMINOPHEN 650 MG: 325 TABLET, FILM COATED ORAL at 08:22

## 2024-09-10 RX ADMIN — LOSARTAN POTASSIUM 50 MG: 50 TABLET, FILM COATED ORAL at 15:31

## 2024-09-10 RX ADMIN — PROPOFOL 150 MG: 10 INJECTION, EMULSION INTRAVENOUS at 09:47

## 2024-09-10 ASSESSMENT — COGNITIVE AND FUNCTIONAL STATUS - GENERAL
STANDING UP FROM CHAIR USING ARMS: 4 - NONE
CLIMB 3 TO 5 STEPS WITH RAILING: 3 - A LITTLE
WALKING IN HOSPITAL ROOM: 3 - A LITTLE
MOVING TO AND FROM BED TO CHAIR: 4 - NONE

## 2024-09-10 NOTE — OR SURGEON
Pre-Procedure patient identification:  I am the primary operating surgeon/proceduralist and I have reviewed the applicable pathology reports and radiology studies for this procedure. I have identified the patient on 09/10/24 at 9:25 AM Mitchell Pandey MD  Phone Number: [phone]   
Pt of Dr. Doherty 25 y/o  @39.2 weeks gestation presents to triage with c/o ctx Q3 mins. Pt denies LOF,VB. Pt reports good fetal movement.  Current AP course uncomplicated  Medical H/x: Denies  Surgical H/x: D&C X5  Ob/Gyn H/X: TOP X5  Psych H/x: Denies  Meds: Pnv  NKDA

## 2024-09-10 NOTE — ANESTHESIA POSTPROCEDURE EVALUATION
Patient: Ursula Holland    Procedure Summary       Date: 09/10/24 Room / Location:  OR 1 / PH OR    Anesthesia Start: 0939 Anesthesia Stop: 1056    Procedure: ANAL SPHINCTEROPLASTY (Anus) Diagnosis:       Full incontinence of feces      (Full incontinence of feces [R15.9])    Surgeons: Mitchell Pandey MD Responsible Provider: Larissa Castro MD    Anesthesia Type: general ASA Status: 3            Anesthesia Type: general  PACU Vitals  9/10/2024 1049 - 9/10/2024 1136        9/10/2024  1049 9/10/2024  1100          BP: 142/72 130/62      Temp: 36.7 °C (98 °F) --      Pulse: 76 72      Resp: 15 16      SpO2: 94 % 94 %                Anesthesia Post Evaluation    Pain management: adequate  Mode of pain management: IV medication  Patient location during evaluation: PACU  Patient participation: complete - patient participated  Level of consciousness: awake and alert  Cardiovascular status: acceptable  Airway Patency: adequate  Respiratory status: acceptable  Hydration status: acceptable  Anesthetic complications: no

## 2024-09-10 NOTE — OP NOTE
UROGYNECOLOGY OPERATIVE REPORT    ANAL SPHINCTEROPLASTY Procedure Note       Ursula Holland  9/10/2024    Procedure:       1  ANAL SPHINCTEROPLASTY (28661)      Pre-op Diagnosis     * Full incontinence of feces [R15.9]    Post-op Diagnosis     * Full incontinence of feces [R15.9]    Surgeon(s) and Role:     * Mitchell Pandey MD - Primary    Assistant:  Karin Lama MD PGY 3    Anesthesia:  General    Anesthesiologist:  Anesthesiologist: Larissa Castro MD  CRNA: Cadence Blanc CRNA    Operative Indications:  79 y.o. 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 I presented her with treatment options including anterior sphincteroplasty (vs neurostimulation). She has tried fiber supplementation, pelvic floor exercise with minimal improvement    Procedure Details   I met with Ursula in the preoperative holding area where she was identified by CHLOE salazar.  She was brought to the operating room and underwent general anesthesia without difficulty and she was positioned dorsolithotomy.  Exam under anesthesia revealed revealed a well supported vagina and intact perineal body.  The anus was slightly gaping at rest and there was an obvious defect in the anterior external anal sphincter on exam.  The vagina perineum and rectum were all prepped and draped in usual sterile manner.    A transverse transperineal approach was used.  A transverse incision was made just above the anal verge through the perineal body.  Sharp blunt dissection was used and the torn ends of the anal sphincter muscle were identified and isolated bilaterally.  We gently dissected the ends of the muscles out from the surrounding fat pad.  An end to end sphincter anastomosis was performed using 5 interrupted 2-0 Maxon sutures these were placed in a horizontal mattress fashion.    Next we reapproximated the subcutaneous space with interrupted 2-0 Polysorb sutures  placed transversely.  Finally we closed the perineal skin transversely with interrupted 3-0 Biosyn placed in vertical mattress fashion.    At the end of the procedure the sphincter now appeared intact anteriorly.  There is no palpable suture within the rectum and there was no rectal bleeding.  Bacitracin was applied over the perineal incision and the patient was discharged to recovery in stable condition    Findings:  Cystoscopy was performed, both ureters were observed to be patent, no bladder or urethral injury was noted      Estimated Blood Loss:  5 mL           Drains:   Urethral Catheter 16 Fr (Active)   Urine Characteristics clear yellow 09/10/24 1103   Securement Method Securing Device 09/10/24 1103       Packs: No vaginal pack           Total IV Fluids: 1000 ml           Specimens: * No specimens in log *           Implants: * No implants in log *          Complications:  none           Disposition: PACU - hemodynamically stable.           Condition: stable    Mitchell Pandey MD  Phone Number: [phone]

## 2024-09-10 NOTE — ANESTHESIA PREPROCEDURE EVALUATION
Relevant Problems   No relevant active problems       Anesthesia ROS/MED HX    Anesthesia History - neg  Pulmonary - neg  Neuro/Psych - neg  Cardiovascular   hypertension   ECG reviewed  GI/Hepatic   GERD    Control: well controlled  Musculoskeletal- neg  Renal Disease- neg  Endo/Other- neg  Body Habitus: Normal  ROS/MED HX Comments:    Pulmonary: Uses inhaler each night for bronchietesis     Past Surgical History:   Procedure Laterality Date   • BREAST LUMPECTOMY Right 2015   • CATARACT EXTRACTION, BILATERAL     • CHOLECYSTECTOMY  2001   • COLONOSCOPY     • EYE SURGERY Right     laser for retinal hole   • HYSTERECTOMY  1980   • OTHER SURGICAL HISTORY      pelvic prolapse surgery       Physical Exam    Airway   Mallampati: II   TM distance: >3 FB   Neck ROM: full  Cardiovascular - normal   Rhythm: regular   Rate: normalPulmonary - normal   clear to auscultation  Dental - normal    Anesthesia Plan    Plan: general    Technique: general LMA     Lines and Monitors: PIV     Airway: oral intubation    3 ASA  Blood Products:   Use of Blood Products Discussed: No   Anesthetic plan and risks discussed with: patient and spouse  Induction:    inhalational   Postop Plan:   Patient Disposition: phase II then home   Pain Management: IV analgesics

## 2024-09-10 NOTE — ANESTHESIOLOGIST PRE-PROCEDURE ATTESTATION
Pre-Procedure Patient Identification:  I am the Primary Anesthesiologist and have identified the patient on 09/10/24 at 8:55 AM.   I have confirmed the procedure(s) will be performed by the following surgeon/proceduralist Mitchell Pandey MD.

## 2024-09-10 NOTE — INTERVAL H&P NOTE
H&P reviewed. The patient was examined and there are no changes to the H&P.  She was successful with an enema last evening

## 2024-09-11 VITALS
WEIGHT: 135 LBS | TEMPERATURE: 98.4 F | HEART RATE: 75 BPM | OXYGEN SATURATION: 92 % | HEIGHT: 62 IN | SYSTOLIC BLOOD PRESSURE: 147 MMHG | RESPIRATION RATE: 16 BRPM | BODY MASS INDEX: 24.84 KG/M2 | DIASTOLIC BLOOD PRESSURE: 78 MMHG

## 2024-09-11 PROBLEM — B37.2 YEAST INFECTION OF THE SKIN: Status: RESOLVED | Noted: 2022-12-07 | Resolved: 2024-09-11

## 2024-09-11 PROCEDURE — 63600000 HC DRUGS/DETAIL CODE: Mod: JZ | Performed by: OBSTETRICS & GYNECOLOGY

## 2024-09-11 PROCEDURE — S5000 PRESCRIPTION DRUG, GENERIC: HCPCS | Performed by: OBSTETRICS & GYNECOLOGY

## 2024-09-11 PROCEDURE — 63700000 HC SELF-ADMINISTRABLE DRUG: Performed by: OBSTETRICS & GYNECOLOGY

## 2024-09-11 PROCEDURE — 99024 POSTOP FOLLOW-UP VISIT: CPT | Performed by: OBSTETRICS & GYNECOLOGY

## 2024-09-11 RX ADMIN — MONTELUKAST 10 MG: 10 TABLET, FILM COATED ORAL at 10:13

## 2024-09-11 RX ADMIN — HYDROCHLOROTHIAZIDE 12.5 MG: 12.5 TABLET ORAL at 10:13

## 2024-09-11 RX ADMIN — PANTOPRAZOLE SODIUM 20 MG: 20 TABLET, DELAYED RELEASE ORAL at 10:13

## 2024-09-11 RX ADMIN — IBUPROFEN 400 MG: 400 TABLET, FILM COATED ORAL at 04:02

## 2024-09-11 RX ADMIN — IBUPROFEN 400 MG: 400 TABLET, FILM COATED ORAL at 10:13

## 2024-09-11 RX ADMIN — ANASTROZOLE 1 MG: 1 TABLET, COATED ORAL at 10:17

## 2024-09-11 RX ADMIN — MAGNESIUM HYDROXIDE 30 ML: 400 SUSPENSION ORAL at 10:17

## 2024-09-11 RX ADMIN — DOCUSATE SODIUM 100 MG: 100 CAPSULE, LIQUID FILLED ORAL at 10:13

## 2024-09-11 RX ADMIN — SODIUM CHLORIDE, POTASSIUM CHLORIDE, SODIUM LACTATE AND CALCIUM CHLORIDE 60 ML/HR: 600; 310; 30; 20 INJECTION, SOLUTION INTRAVENOUS at 04:02

## 2024-09-11 RX ADMIN — LOSARTAN POTASSIUM 50 MG: 50 TABLET, FILM COATED ORAL at 10:13

## 2024-09-11 ASSESSMENT — COGNITIVE AND FUNCTIONAL STATUS - GENERAL
CLIMB 3 TO 5 STEPS WITH RAILING: 3 - A LITTLE
MOVING TO AND FROM BED TO CHAIR: 4 - NONE
WALKING IN HOSPITAL ROOM: 4 - NONE
STANDING UP FROM CHAIR USING ARMS: 4 - NONE

## 2024-09-11 NOTE — PROGRESS NOTES
"Urogynecology Post Op Progress Note    Ursula Holland    1 Day Post-Op    Pre-Op Diagnosis Codes:     * Full incontinence of feces [R15.9]    Procedure: anal sphincteroplasty     Subjective     Stable Overnight, denies N/V fever or flank pain.  No active vaginal bleeding. Tolerating diet.  Pain is well controlled. Saw dietician. Masters is out    Objective     Visit Vitals  BP (!) 147/78 (BP Location: Left upper arm, Patient Position: Lying)   Pulse 75   Temp 36.9 °C (98.4 °F) (Oral)   Resp 16   Ht 1.575 m (5' 2\")   Wt 61.2 kg (135 lb)   SpO2 92%   BMI 24.69 kg/m²       Vital signs in last 24 hours:  Temp:  [36.3 °C (97.4 °F)-36.9 °C (98.4 °F)] 36.9 °C (98.4 °F)  Heart Rate:  [62-75] 75  Resp:  [14-17] 16  BP: (123-147)/(60-78) 147/78      Intake/Output Summary (Last 24 hours) at 9/11/2024 1153  Last data filed at 9/11/2024 0745  Gross per 24 hour   Intake 120 ml   Output 1250 ml   Net -1130 ml         Physical Exam:  General Appearance:    Alert, cooperative, no distress, appears stated age   Head:    Normocephalic, without obvious abnormality, atraumatic   Neck:   Supple, symmetrical, trachea midline, no adenopathy;     thyroid:  no enlargement/tenderness/nodules; no carotid    bruit or JVD   Lungs:     Clear to auscultation bilaterally, respirations unlabored   Heart:    Regular rate and rhythm, S1 and S2 normal, no murmur, rub or          gallop   Abdomen:     No CVAT, non distended appropriate tenderness, no                      rebound/guarding, active BS, Incision is intact, dry, no redness,         minimal drainage       Genitourinary:   Masters:   No active bleeding, Vaginal pack: N/A    Urine clear, no blood   Extremities:   No palpable cords, SCDs are on   Skin:   Skin color, texture, turgor normal, no rashes or lesions   Behavior/  Emotional:   Appropriate, cooperative     Incision  Clean, Dry and Intact and No Evidence of Infection          Tube and Drains:   Masters - urine is clear, without blood    VTE " Assessment:   I have reassessed and the patient's VTE risk and treatment plan is appropriate.    Post Op SCIP Measures:  SCIP VTE: SCDS is being continued    Assessment/Plan   POD 1   Doing well  Plan discharge this am after she voids  Discussed followup in two weeks  Stool softeners, low fiber diet  Expected Discharge Date:   9/11/2024      Mitchell Pandey MD

## 2024-09-11 NOTE — CONSULTS
Clinical Nutrition Note    Clinical Comments:  Consult received for Low Fiber diet education. Pt is 80 yo female POD#1 s/p anal sphincteroplasty. Pmhx includes breast CA, GERD, HTN, lupus. Pt is ordered a Low fiber diet at this time. Plan for d/c home today. Reviewed low fiber diet with pt at bedside. Provided pt with handout and answered all questions at this time. Pt reports feeling well, she tolerated eggs this am well. No additional nutrition questions/concerns at this time. Nutrition will follow at LOS per policy, if d/c plans change.       Discussed with: patient                            Verbalizes understanding    BAUTISTA Sneed

## 2024-09-11 NOTE — PLAN OF CARE
Care Coordination Discharge Plan Summary    Admission Assessment Summary    General Information  Readmission Within the last 30 days:    Does patient have a :    Patient-Specific Goals (include timeframe):      Living Arrangements  Arrived From:  Home  Current Living Arrangements:    People in Home:  Spouse  Home Accessibility:    Living Arrangement Comments:      Social Determinants of Health - Screenings  Housing Stability     Utility Access     Transportation Needs       Functional Status Prior to Admission  Assistive Device/Animal Currently Used at Home:    Functional Status Comments:    IADL Comments:      Discharge Needs Assessment    Concerns to be Addressed:    Current Discharge Risk:    Anticipated Changes Related to Illness:      Discharge Plan Summary    Patient Choice          Concerns / Comments:  Patient is for discharge to home today.  at bedside will provide transportation. Patient denies d/c needs.     Discharge Plan:  Disposition/Destination: Home  /         Connection to Community     Community Resources:      Discharge Transportation:  Is Out of Hospital DNR needed at Discharge:    Does patient need discharge transport?

## 2024-09-11 NOTE — DISCHARGE INSTRUCTIONS
POSTOPERATIVE INSTRUCTIONS FOR VAGINAL SURGERY  MD Dr Titi Ortez MD PhD    [phone]    INSTRUCTIONS FOR RECTOVAGINAL FISTULA SURGERY    It is ok to take a shower, but no tub baths, pool, or swimming for 2 weeks    After one week, you can consider doing Sitz baths for 15-20 minutes, 2-3 times per day are fine.  Make sure the vulva and vagina are dry afterward - you can use a blowdryer on a low, cool setting if needed.    DIET AND LAXATIVES:  It is ok to eat lightly for the next few days.  In general, we recommend a low residue (low fiber) diet for 3 -4 weeks.  However, it this is too difficult to follow, it is okay to resume your regular diet.  We encourage you to use laxatives for the next few weeks.  Colace is a stool softener which can be taken twice a day for the next three weeks.  With regards to laxatives, there are several options:  Milk of magnesia can be taken twice a day  Dulcolax tablets can be used as needed,  OR  You can take MiraLAX 17 gm (1 dose) by mouth twice per day, and adjust the dose every 3-4 days (if needed) to keep your stools formed, but soft (like toothpaste or mashed potatoes).    Expect light vaginal bleeding for a few days, You will also experience a vaginal discharge for up to three weeks. It normally takes 6 to 8 weeks for the vaginal sutures to dissolve    Take pain medications such as motrin or tylenol. These medications are often most effective when you take both of them,  by a few hours. Alternating the types of pain meds also helps to minimize the likelihood of side effects such as constipation. For the first two days, we suggest that you take tylenol - 500mg  every six hours, and motrin/ibuprofen - 600 mg every six hours, around the clock, and reserve the narcotics (hydromorphone) for breakthrough pain    ACTIVITY:  You will be the most comfortable either standing or reclined. Lying on your side is okay.  Avoid prolonged sitting, especially on a  hard chair. Using a cushion or pillow is okay    Avoid squatting. . Avoid exercise for at least 2-3 weeks. Walking is fine.     Call for severe pain, heavy vaginal bleeding, pus like drainage, or fever.    You can resume your usual home medications today    In general, avoid taking aspirin or vitamin E for five days following surgery, unless instructed otherwise. Resume your regular medications unless instructed otherwise.    Avoid prolonged sitting, as this may be uncomfortable. You will be more comfortable standing or lying.     Some women can experience urinary retention following this type of surgery.  Call immediately if you are not able to adequately empty your bladder, or, are voiding only small amounts.    Avoid sexual intercourse until seen by your doctor    Call the office with any concerns. If you do not hear back from the on call physician within twenty minutes, please call again, as sometimes the message may not get transmitted properly.    YOU CAN FIND ADDITIONAL INFORMATION AND INSTRUCTIONS FOR POSTOP CARE AT OUR WEBSITE [email or web address] under YOUR SURGERY    We will see you back in the office in two weeks to check the incision.  It is okay if the incision opens slightly, or if one or two sutures come out.  The incision was sutured closed with multiple sutures.  All of these sutures will dissolve slowly over time

## 2024-09-11 NOTE — NURSING NOTE
Patient catheter removed and voiding without issue. Pain controlled with Motrin. Discharge instructions reviewed with spouse. Discharged to home.

## 2024-09-14 ENCOUNTER — TELEPHONE (OUTPATIENT)
Dept: GYNECOLOGY | Facility: CLINIC | Age: 79
End: 2024-09-14
Payer: COMMERCIAL

## 2024-09-14 NOTE — TELEPHONE ENCOUNTER
Pt called with worsening pain following surgery on 9/10/2024.  Patient underwent an anal sphincteroplasty appears to be uncomplicated.  Patient states that she was admitted overnight in the hospital really did not have any postoperative pain but since she has been discharged to home on Wednesday of this week she has had intense burning anal pain at the site of her surgery that has progressively worsened since Wednesday.  Patient tells me that she was given Tylenol and Motrin as postoperative pain medications, no narcotics, and unfortunately because of her concern given her history of diverticulitis and gastroesophageal reflux she has not used any of the Motrin she was prescribed she has only been relying on Tylenol.  She has been told by hr PCP that these medications (NSAIDs) may effect her chronic conditions.    Otherwise patient denies any issues or complications related to the surgery.  We discussed to what I believe are reasonable options which would be to begin taking the Motrin as prescribed by her surgeon I reassured the patient if she uses this medication only for the next 24 to 72 hours it will be incredibly unlikely that this causes any problem or issue related to her chronic medical conditions.  Alternatively, I did offer the patient that if she is uncomfortable trying these medications or if she tries them and they are not successful that I will prescribe a low-dose opioid medication to control her what appears to be uncontrolled postoperative pain.  Patient was happy with this plan will try the Motrin and if not successfully treated will call the answering service back to discuss use of a potential opioid.

## 2024-09-16 ENCOUNTER — TELEPHONE (OUTPATIENT)
Dept: UROGYNECOLOGY | Facility: CLINIC | Age: 79
End: 2024-09-16
Payer: COMMERCIAL

## 2024-09-16 NOTE — TELEPHONE ENCOUNTER
She feels much better since the weekend. She did add some motrin which made a big difference. Also using tucks pads. I reminded her to be seen next week for an appt

## 2024-09-19 ENCOUNTER — TELEPHONE (OUTPATIENT)
Dept: UROGYNECOLOGY | Facility: CLINIC | Age: 79
End: 2024-09-19
Payer: COMMERCIAL

## 2024-09-19 ENCOUNTER — OFFICE VISIT (OUTPATIENT)
Dept: UROGYNECOLOGY | Facility: CLINIC | Age: 79
End: 2024-09-19
Payer: COMMERCIAL

## 2024-09-19 VITALS
WEIGHT: 135 LBS | SYSTOLIC BLOOD PRESSURE: 148 MMHG | BODY MASS INDEX: 23.92 KG/M2 | DIASTOLIC BLOOD PRESSURE: 74 MMHG | HEIGHT: 63 IN

## 2024-09-19 DIAGNOSIS — Z09 POSTOP CHECK: Primary | ICD-10-CM

## 2024-09-19 PROCEDURE — 99024 POSTOP FOLLOW-UP VISIT: CPT | Performed by: OBSTETRICS & GYNECOLOGY

## 2024-09-19 RX ORDER — NAPROXEN 375 MG/1
375 TABLET ORAL
Qty: 60 TABLET | Refills: 1 | Status: SHIPPED | OUTPATIENT
Start: 2024-09-19 | End: 2024-10-28

## 2024-09-19 NOTE — PROGRESS NOTES
Ursula Holland a 79 year old is calling office s/p surgery for fecal incontinence and prior obstetric sphincter tear that was repaired on 9/10/24 she is 9 days post op with complaints of vaginal and rectal burning along with itching x 1 week with accompanied vaginal discharge yellow in color without odor .   She is here for an same day appointment  Urine dip     ALEX Roa

## 2024-09-19 NOTE — TELEPHONE ENCOUNTER
Ursula Holland a 79 year old  is calling office s/p surgery for fecal incontinence and prior obstetric sphincter tear that was repaired on 9/10/24 she is 9 days post op with complaints of  vaginal and rectal burning along with itching x 1 week with accompanied  vaginal discharge yellow in color without odor .  She has been using various approaches , mini pad using a hair dryer Dermoplast spray and sitz baths without relief and worsening .  Advised office appointment and in the meantime keep area dry cotton underwear ,as well as   vulvar hygiene   Dr Pandey notified and will call brittanie Zhang RN

## 2024-09-19 NOTE — PROGRESS NOTES
"Ursula Holland  Visit Date: 09/19/2024     Patient is here for a postoperative visit.  She is  1 week(s) status post  Anal Sphincteroplasty on 9/10/2024  Overall, she is doing okay but is having a lot of itching and pain at the surgical site. Bowels are a bit loose      Visit Vitals  BP (!) 148/74   Ht 1.6 m (5' 3\")   Wt 61.2 kg (135 lb)   BMI 23.91 kg/m²       The following have been marked reviewed and updated as appropriate in this visit:   Allergies  Meds  Problems         Review of Systems  Physical Exam  Constitutional:       Appearance: Normal appearance. She is well-developed.     Genitourinary:    Urethra, bladder, urethral meatus, external female genitalia and adnexa normal.   Pelvic exam was performed with patient in lithotomy exam position.   Pelvic exam conducted with chaperone present    Rectal exam shows no rectal prolapse and no external hemorrhoid.    Genitourinary Comments: Genitourinary exam was conducted with a chaperone present in the room  Perineal incision is intact without redness or fluctuance  Some swelling. No drainage     Neck:      Thyroid: No thyromegaly.      Vascular: No JVD.   Cardiovascular:      Pulses: Normal pulses.      Comments: No  JVD  No peripheral edema  Peripheral vascular normal  Pulmonary:      Effort: Pulmonary effort is normal. No tachypnea or respiratory distress.   Abdominal:      General: Abdomen is flat. There is no distension.      Palpations: There is no hepatomegaly.      Tenderness: There is no abdominal tenderness. There is no right CVA tenderness, left CVA tenderness, guarding or rebound.      Hernia: No hernia is present.   Neurological:      Mental Status: She is alert and oriented to person, place, and time.      Sensory: No sensory deficit.   Skin:     Findings: No bruising or rash.   Psychiatric:         Attention and Perception: She is attentive.         Mood and Affect: Affect is not inappropriate.         Behavior: Behavior normal. "         Plan:  Diagnoses and all orders for this visit:    Postop check (Primary)  Assessment & Plan:  I reassured Ursula that her incision is intact without evidence of infection  Try naprosyn 375 mg TID as well as ice  She can also use Tucks pads, or sitz baths  Stop the colace  She can call me in a few days to see how she is progressing      Other orders  -     naproxen (NAPROSYN) 375 mg tablet; Take 1 tablet (375 mg total) by mouth 3 (three) times a day with meals.        One week or sooner

## 2024-09-19 NOTE — ASSESSMENT & PLAN NOTE
I reassured Ursula that her incision is intact without evidence of infection  Try naprosyn 375 mg TID as well as ice  She can also use Tucks pads, or sitz baths  Stop the colace  She can call me in a few days to see how she is progressing

## 2024-09-20 RX ORDER — FLUCONAZOLE 150 MG/1
150 TABLET ORAL
Qty: 3 TABLET | Refills: 0 | Status: SHIPPED | OUTPATIENT
Start: 2024-09-20 | End: 2024-09-25

## 2024-09-25 ENCOUNTER — OFFICE VISIT (OUTPATIENT)
Dept: UROGYNECOLOGY | Facility: CLINIC | Age: 79
End: 2024-09-25
Payer: COMMERCIAL

## 2024-09-25 VITALS
BODY MASS INDEX: 23.92 KG/M2 | HEIGHT: 63 IN | WEIGHT: 135 LBS | SYSTOLIC BLOOD PRESSURE: 118 MMHG | DIASTOLIC BLOOD PRESSURE: 62 MMHG

## 2024-09-25 DIAGNOSIS — Z09 POSTOP CHECK: Primary | ICD-10-CM

## 2024-09-25 PROBLEM — R15.9 FULL INCONTINENCE OF FECES: Status: RESOLVED | Noted: 2022-10-17 | Resolved: 2024-09-25

## 2024-09-25 PROBLEM — N30.00 ACUTE CYSTITIS WITHOUT HEMATURIA: Status: RESOLVED | Noted: 2024-08-27 | Resolved: 2024-09-25

## 2024-09-25 PROCEDURE — 99024 POSTOP FOLLOW-UP VISIT: CPT | Performed by: OBSTETRICS & GYNECOLOGY

## 2024-09-25 NOTE — PROGRESS NOTES
"Visit Date: 09/25/2024   Ursula Holland is 79 y.o. female who is here for a re-evaluation of her anal sphincteroplasty. She is two weeks postop. Her pain is better. Denies vaginal or rectal bleeding. Has better anal control, fecal urgency has resolved. Still has some itching      The following have been reviewed and updated as appropriate in this visit:  Allergies  Meds  Problems       Visit Vitals  /62   Ht 1.6 m (5' 3\")   Wt 61.2 kg (135 lb)   BMI 23.91 kg/m²       Review of Systems  Physical Exam  Constitutional:       Appearance: Normal appearance. She is well-developed.     Genitourinary:    Urethra, bladder, vagina, urethral meatus, external female genitalia and adnexa normal.   Pelvic exam was performed with patient in lithotomy exam position.   Pelvic exam conducted with chaperone present    Rectal exam shows no rectal prolapse and no external hemorrhoid.    Genitourinary Comments: Genitourinary exam was conducted with a chaperone present in the room  Perineum is intact without redness or drainage     Neck:      Thyroid: No thyromegaly.      Vascular: No JVD.   Cardiovascular:      Pulses: Normal pulses.      Comments: No  JVD  No peripheral edema  Peripheral vascular normal  Pulmonary:      Effort: Pulmonary effort is normal. No tachypnea or respiratory distress.   Abdominal:      General: Abdomen is flat. There is no distension.      Palpations: There is no hepatomegaly.      Tenderness: There is no abdominal tenderness. There is no right CVA tenderness, left CVA tenderness, guarding or rebound.      Hernia: No hernia is present.   Neurological:      Mental Status: She is alert and oriented to person, place, and time.      Sensory: No sensory deficit.   Skin:     Findings: No bruising or rash.   Psychiatric:         Attention and Perception: She is attentive.         Mood and Affect: Affect is not inappropriate.         Behavior: Behavior normal.         PLAN:  Diagnoses and all orders for this " visit:    Postop check (Primary)  Assessment & Plan:  Two weeks postop          Return in about 4 weeks (around 10/23/2024).      Mitchell Pandey MD

## 2024-10-28 ENCOUNTER — OFFICE VISIT (OUTPATIENT)
Dept: UROGYNECOLOGY | Facility: CLINIC | Age: 79
End: 2024-10-28
Payer: COMMERCIAL

## 2024-10-28 VITALS
HEIGHT: 62 IN | DIASTOLIC BLOOD PRESSURE: 70 MMHG | WEIGHT: 138 LBS | SYSTOLIC BLOOD PRESSURE: 128 MMHG | BODY MASS INDEX: 25.4 KG/M2

## 2024-10-28 DIAGNOSIS — Z09 POSTOP CHECK: Primary | ICD-10-CM

## 2024-10-28 PROCEDURE — 99024 POSTOP FOLLOW-UP VISIT: CPT | Performed by: OBSTETRICS & GYNECOLOGY

## 2024-10-28 ASSESSMENT — ENCOUNTER SYMPTOMS
FEVER: 0
ABDOMINAL PAIN: 0
BRUISES/BLEEDS EASILY: 0
NERVOUS/ANXIOUS: 0
POLYPHAGIA: 0
CHILLS: 0
CONSTIPATION: 0
DYSURIA: 0
FLANK PAIN: 0
FREQUENCY: 0
UNEXPECTED WEIGHT CHANGE: 0
ABDOMINAL DISTENTION: 0
NUMBNESS: 0
PALPITATIONS: 0
SHORTNESS OF BREATH: 0
ADENOPATHY: 0
COUGH: 0
DYSPHORIC MOOD: 0

## 2024-10-28 NOTE — PROGRESS NOTES
"Ursula Holland is 79 y.o. female who is here for a re-evaluation of her anal sphincteroplasty. She is 4-5 weeks  postop. Her pain at previous visit was reported as negligible but now she can feel an irritation and believes these are stitches ; At times concerned regarding straining of stool and reports has soft braydon movements at time sand normallt goes about 3 times daily     Denies vaginal or rectal bleeding.  Anal control / continence yes not leaking stool\" but I am never sure if Im empty \"  Unable to voids at this time   E Vicente RN    "

## 2024-10-28 NOTE — PROGRESS NOTES
"Ursula Holland  Visit Date: 10/28/2024     Patient is here for a postoperative visit.  She is  6 week(s) status post  Anal Sphincteroplasty on 9/10/2024  Continues to do well. Bowel control is much improved. No vaginal or rectal bleeding. Can feel one of the stitches      Visit Vitals  /70   Ht 1.575 m (5' 2\")   Wt 62.6 kg (138 lb)   BMI 25.24 kg/m²       The following have been marked reviewed and updated as appropriate in this visit:          Review of Systems   Constitutional:  Negative for chills, fever and unexpected weight change.   Respiratory:  Negative for cough and shortness of breath.    Cardiovascular:  Negative for chest pain, palpitations and leg swelling.   Gastrointestinal:  Negative for abdominal distention, abdominal pain and constipation.   Endocrine: Negative for polyphagia.   Genitourinary:  Negative for dysuria, enuresis, flank pain, frequency, pelvic pain and vaginal discharge.   Skin:  Negative for rash.   Neurological:  Negative for numbness.   Hematological:  Negative for adenopathy. Does not bruise/bleed easily.   Psychiatric/Behavioral:  Negative for dysphoric mood. The patient is not nervous/anxious.      Physical Exam  Constitutional:       Appearance: Normal appearance. She is well-developed.     Genitourinary:    Urethra, bladder, urethral meatus, external female genitalia and adnexa normal.   Pelvic exam was performed with patient in lithotomy exam position.   Pelvic exam conducted with staff chaperone present. There is no cystocele or rectocele present. Perineum intact.   Rectal exam shows no rectal prolapse and no external hemorrhoid.    Genitourinary Comments: Genitourinary exam was conducted with a chaperone present in the room  Perineum intact, sphincter intact  I can palpate not visualize the tip of one of the Maxon sutures     Neck:      Thyroid: No thyromegaly.      Vascular: No JVD.   Cardiovascular:      Pulses: Normal pulses.      Comments: No  JVD  No peripheral " edema  Peripheral vascular normal  Pulmonary:      Effort: Pulmonary effort is normal. No tachypnea or respiratory distress.   Abdominal:      General: Abdomen is flat. There is no distension.      Palpations: There is no hepatomegaly.      Tenderness: There is no abdominal tenderness. There is no right CVA tenderness, left CVA tenderness, guarding or rebound.      Hernia: No hernia is present.   Neurological:      Mental Status: She is alert and oriented to person, place, and time.      Sensory: No sensory deficit.   Skin:     Findings: No bruising or rash.   Psychiatric:         Attention and Perception: She is attentive.         Mood and Affect: Affect is not inappropriate.         Behavior: Behavior normal.         Plan:  Diagnoses and all orders for this visit:    Postop check (Primary)  Assessment & Plan:  Continues to do well  Better bowel control  Incision and sphincter are intact

## 2024-11-25 ENCOUNTER — OFFICE VISIT (OUTPATIENT)
Dept: UROGYNECOLOGY | Facility: CLINIC | Age: 79
End: 2024-11-25
Payer: COMMERCIAL

## 2024-11-25 VITALS
SYSTOLIC BLOOD PRESSURE: 122 MMHG | BODY MASS INDEX: 25.4 KG/M2 | DIASTOLIC BLOOD PRESSURE: 64 MMHG | WEIGHT: 138 LBS | HEIGHT: 62 IN

## 2024-11-25 DIAGNOSIS — Z09 POSTOP CHECK: Primary | ICD-10-CM

## 2024-11-25 PROCEDURE — 99024 POSTOP FOLLOW-UP VISIT: CPT | Performed by: OBSTETRICS & GYNECOLOGY

## 2024-11-25 ASSESSMENT — ENCOUNTER SYMPTOMS
NERVOUS/ANXIOUS: 0
UNEXPECTED WEIGHT CHANGE: 0
POLYPHAGIA: 0
NUMBNESS: 0
COUGH: 0
DYSPHORIC MOOD: 0
ABDOMINAL PAIN: 0
SHORTNESS OF BREATH: 0
ABDOMINAL DISTENTION: 0
BRUISES/BLEEDS EASILY: 0
CHILLS: 0
FREQUENCY: 0
PALPITATIONS: 0
CONSTIPATION: 0
FLANK PAIN: 0
DYSURIA: 0
FEVER: 0
ADENOPATHY: 0

## 2024-11-25 NOTE — PROGRESS NOTES
"Ursula Holland  Visit Date: 11/25/2024     Patient is here for a postoperative visit.  She is  10 week(s) status post  Anal Sphincteroplasty on 9/10/2024  Overall doing well. Better bowel control. Her perineal discomfort has resolved    Visit Vitals  /64   Ht 1.575 m (5' 2\")   Wt 62.6 kg (138 lb)   BMI 25.24 kg/m²       The following have been marked reviewed and updated as appropriate in this visit:   Allergies  Meds  Problems  Med Hx  Surg Hx         Review of Systems   Constitutional:  Negative for chills, fever and unexpected weight change.   Respiratory:  Negative for cough and shortness of breath.    Cardiovascular:  Negative for chest pain, palpitations and leg swelling.   Gastrointestinal:  Negative for abdominal distention, abdominal pain and constipation.   Endocrine: Negative for polyphagia.   Genitourinary:  Negative for dysuria, enuresis, flank pain, frequency, pelvic pain and vaginal discharge.   Skin:  Negative for rash.   Neurological:  Negative for numbness.   Hematological:  Negative for adenopathy. Does not bruise/bleed easily.   Psychiatric/Behavioral:  Negative for dysphoric mood. The patient is not nervous/anxious.      Physical Exam  Constitutional:       Appearance: Normal appearance. She is well-developed.     Genitourinary:    Urethra, bladder, urethral meatus, external female genitalia and adnexa normal.   Pelvic exam was performed with patient in lithotomy exam position.   Pelvic exam conducted with staff chaperone present. There is no urethral urethrocele or urethral diverticulum. No stress urinary incontinence.There is no cystocele or rectocele present. Perineum intact. Rectovaginal exam normal.   Rectal exam shows normal sphincter tone, no rectal prolapse and no external hemorrhoid.    Genitourinary Comments: Genitourinary exam was conducted with a chaperone present in the room     Neck:      Thyroid: No thyromegaly.      Vascular: No JVD.   Cardiovascular:      Pulses: " Normal pulses.      Comments: No  JVD  No peripheral edema  Peripheral vascular normal  Pulmonary:      Effort: Pulmonary effort is normal. No tachypnea or respiratory distress.   Abdominal:      General: Abdomen is flat. There is no distension.      Palpations: There is no hepatomegaly.      Tenderness: There is no abdominal tenderness. There is no right CVA tenderness, left CVA tenderness, guarding or rebound.      Hernia: No hernia is present.   Neurological:      Mental Status: She is alert and oriented to person, place, and time.      Sensory: No sensory deficit.   Skin:     Findings: No bruising or rash.   Psychiatric:         Attention and Perception: She is attentive.         Mood and Affect: Affect is not inappropriate.         Behavior: Behavior normal.         Plan:  Diagnoses and all orders for this visit:    Postop check (Primary)  Assessment & Plan:  Continues to do well s/p sphincteroplasty

## 2025-05-04 ENCOUNTER — HOSPITAL ENCOUNTER (OUTPATIENT)
Facility: CLINIC | Age: 80
Discharge: HOME | End: 2025-05-04
Attending: FAMILY MEDICINE
Payer: COMMERCIAL

## 2025-05-04 VITALS
OXYGEN SATURATION: 99 % | TEMPERATURE: 98.1 F | SYSTOLIC BLOOD PRESSURE: 118 MMHG | HEART RATE: 70 BPM | DIASTOLIC BLOOD PRESSURE: 60 MMHG

## 2025-05-04 DIAGNOSIS — R10.32 LLQ ABDOMINAL PAIN: Primary | ICD-10-CM

## 2025-05-04 LAB
BILIRUBIN, POC: NEGATIVE
BLOOD URINE, POC: NEGATIVE
CLARITY, POC: CLEAR
COLOR, POC: YELLOW
EXPIRATION DATE: ABNORMAL
GLUCOSE URINE, POC: NEGATIVE
KETONES, POC: NEGATIVE
LEUKOCYTE EST, POC: ABNORMAL
Lab: ABNORMAL
NITRITE, POC: NEGATIVE
PH, POC: 5
POCT MANUFACTURER: ABNORMAL
PROTEIN, POC: NEGATIVE
SPECIFIC GRAVITY, POC: 1
UROBILINOGEN, POC: 0.2

## 2025-05-04 PROCEDURE — 87086 URINE CULTURE/COLONY COUNT: CPT | Performed by: FAMILY MEDICINE

## 2025-05-04 PROCEDURE — 99203 OFFICE O/P NEW LOW 30 MIN: CPT | Performed by: FAMILY MEDICINE

## 2025-05-04 PROCEDURE — 81002 URINALYSIS NONAUTO W/O SCOPE: CPT | Performed by: FAMILY MEDICINE

## 2025-05-04 PROCEDURE — S9083 URGENT CARE CENTER GLOBAL: HCPCS | Performed by: FAMILY MEDICINE

## 2025-05-06 ENCOUNTER — RESULTS FOLLOW-UP (OUTPATIENT)
Dept: URGENT CARE | Facility: CLINIC | Age: 80
End: 2025-05-06

## 2025-05-06 LAB
BACTERIA UR CULT: ABNORMAL
BACTERIA UR CULT: ABNORMAL

## 2025-05-06 RX ORDER — NITROFURANTOIN 25; 75 MG/1; MG/1
100 CAPSULE ORAL 2 TIMES DAILY
Qty: 10 CAPSULE | Refills: 0 | Status: SHIPPED | OUTPATIENT
Start: 2025-05-06 | End: 2025-05-11

## 2025-05-22 ENCOUNTER — TELEPHONE (OUTPATIENT)
Dept: PRIMARY CARE | Facility: CLINIC | Age: 80
End: 2025-05-22

## 2025-05-22 NOTE — TELEPHONE ENCOUNTER
Pt would like a call from Dr. Fried, she's not established yet but would like a call concerning an appt    Best contact: [phone]

## (undated) DEVICE — SPONGE LAP 18X18 SAFE-T RFID ENHANCED XRAY

## (undated) DEVICE — COVER MAYO STAND

## (undated) DEVICE — Device

## (undated) DEVICE — BLADE SCALPEL #15

## (undated) DEVICE — SUTURE POLYSORB 2-0 UNDYED 1X30 V-20

## (undated) DEVICE — PACKING VAGINAL 2X72 4PLY

## (undated) DEVICE — PENEVAC1 NONSTICK SMOKE EVAC

## (undated) DEVICE — GAUZE VAGINAL 2X72 4PLY RFID XRAY

## (undated) DEVICE — PARTICLES HEMOSTATIC ARISTA 5 GRAM

## (undated) DEVICE — PAD PERI MATERNITY LENGTH

## (undated) DEVICE — COVER LIGHTHANDLE

## (undated) DEVICE — PAD GROUND ELECTROSURGICAL W/CORD

## (undated) DEVICE — ***USE 56901*** SUTURE PDS II 4-0 Z304H RB-1 27IN VIOLET

## (undated) DEVICE — ***USE 138727*** SUTURE VICRYL 2-0 JJ42G CT-1 CR 27IN

## (undated) DEVICE — GOWN SURG STRL W/TWL LG

## (undated) DEVICE — SYRINGE 10CC CONTROL LL

## (undated) DEVICE — SURGIFLO HEMOSTATIC MATRIX 8ML

## (undated) DEVICE — NEEDLE BOX CONVENIENCE KIT

## (undated) DEVICE — SOLN IRRIG .9%SOD 1000ML

## (undated) DEVICE — SUTURE VICRYL 3-0 J316H SH VIOLET

## (undated) DEVICE — UNDERPAD DURASORB 30 X 30

## (undated) DEVICE — TUBE SUCTION 1/4INX20FT STERILE

## (undated) DEVICE — ***USE 141024*** PACK LAPAROSCOPY 1

## (undated) DEVICE — SET CYSTOSCOPY/IRRIGATION LATEXFREE

## (undated) DEVICE — SUTURE VELOSORB 3-0 UNDYED 1X30 V-20

## (undated) DEVICE — DRESSING TELFA 3X8

## (undated) DEVICE — SYRINGE DISP LUER-LOK 30 CC

## (undated) DEVICE — GOWN SURG STRL W/TWL XLG

## (undated) DEVICE — SOLN IRRIG .9%SOD 500ML

## (undated) DEVICE — KIT NEEDLE PAD COMBO

## (undated) DEVICE — GAUZE 4X4 16 PLY RFID DOUBLE XRAY

## (undated) DEVICE — MANIFOLD FOUR PORT NEPTUNE

## (undated) DEVICE — SUTURE MAXON 2-0 GREEN 1X30 GS-22

## (undated) DEVICE — NEEDLE DISP HYPO 22GX1-1/2IN

## (undated) DEVICE — TIP SUCTION YANKAUER

## (undated) DEVICE — CONTAINER SPECIMEN STERILE 5OZ

## (undated) DEVICE — SUTURE POLYSORB 3-0 UNDYED 1X30 V-20

## (undated) DEVICE — TOWEL WHITE XRAY DETECTABLE

## (undated) DEVICE — PENCIL ESU HANDSWITCHING W/HOL

## (undated) DEVICE — DRAPE POUCH IRRIGATION

## (undated) DEVICE — COVER LIGHTHANDLE (STERILE SINGLE PA

## (undated) DEVICE — LABEL MEDICATION GEN/GYN

## (undated) DEVICE — ***USE 59073*** SUTURE MONOCRYL 2-0 CR/CT-1 VIL MON

## (undated) DEVICE — GLOVE PROTEXIS PI ORTHO 7.5

## (undated) DEVICE — CLIP APPLIER HEMOLOK MED/LG

## (undated) DEVICE — TRAY URINE METER FOLEY NON-SILVER

## (undated) DEVICE — BLADE SCALPEL #10

## (undated) DEVICE — SUTURE BIOSYN 4-0 UNDYED 1X18 P-14

## (undated) DEVICE — PACK OR TOWEL

## (undated) DEVICE — SPONGE LAP 4X18 SAFE-T RFID ENHANCED XRAY

## (undated) DEVICE — SOLN IV 0.9% NSS 1000ML

## (undated) DEVICE — SOLN IRRIG STER WATER 1500ML

## (undated) DEVICE — NEEDLE DISP SPINAL 22GX3-1/2IN

## (undated) DEVICE — SYRINGE DISP LUER-LOK 50 CC

## (undated) DEVICE — TUBING CYSTO BLADDER IRRIG

## (undated) DEVICE — BETADINE SOLUTION 8OZ BT